# Patient Record
Sex: MALE | Race: WHITE | NOT HISPANIC OR LATINO | Employment: UNEMPLOYED | URBAN - METROPOLITAN AREA
[De-identification: names, ages, dates, MRNs, and addresses within clinical notes are randomized per-mention and may not be internally consistent; named-entity substitution may affect disease eponyms.]

---

## 2017-02-27 DIAGNOSIS — F41.1 GENERALIZED ANXIETY DISORDER: ICD-10-CM

## 2017-02-27 RX ORDER — ESCITALOPRAM OXALATE 10 MG/1
10 TABLET ORAL DAILY
Qty: 90 TABLET | Refills: 0 | Status: SHIPPED | OUTPATIENT
Start: 2017-02-27 | End: 2017-03-27

## 2017-03-08 ENCOUNTER — PREP FOR SURGERY (OUTPATIENT)
Dept: GASTROENTEROLOGY | Facility: CLINIC | Age: 57
End: 2017-03-08

## 2017-03-08 DIAGNOSIS — K63.5 COLON POLYPS: Primary | ICD-10-CM

## 2017-03-08 RX ORDER — SODIUM CHLORIDE 0.9 % (FLUSH) 0.9 %
1-10 SYRINGE (ML) INJECTION AS NEEDED
Status: CANCELLED | OUTPATIENT
Start: 2017-03-08

## 2017-03-11 DIAGNOSIS — F41.1 GENERALIZED ANXIETY DISORDER: ICD-10-CM

## 2017-03-13 RX ORDER — ESCITALOPRAM OXALATE 10 MG/1
TABLET ORAL
Qty: 90 TABLET | Refills: 1 | Status: SHIPPED | OUTPATIENT
Start: 2017-03-13 | End: 2017-10-04 | Stop reason: SDUPTHER

## 2017-03-27 ENCOUNTER — OFFICE VISIT (OUTPATIENT)
Dept: FAMILY MEDICINE CLINIC | Facility: CLINIC | Age: 57
End: 2017-03-27

## 2017-03-27 VITALS
OXYGEN SATURATION: 98 % | DIASTOLIC BLOOD PRESSURE: 88 MMHG | WEIGHT: 145.4 LBS | HEART RATE: 88 BPM | SYSTOLIC BLOOD PRESSURE: 130 MMHG | BODY MASS INDEX: 22.82 KG/M2 | HEIGHT: 67 IN

## 2017-03-27 DIAGNOSIS — R21 RASH: ICD-10-CM

## 2017-03-27 DIAGNOSIS — F41.1 GENERALIZED ANXIETY DISORDER: Primary | ICD-10-CM

## 2017-03-27 PROCEDURE — 99213 OFFICE O/P EST LOW 20 MIN: CPT | Performed by: INTERNAL MEDICINE

## 2017-03-27 NOTE — PROGRESS NOTES
CC: Anxiety follow-up. New lower leg rash.    He was started on Lexapro in 2015 for emotional lability due to stress at work. Still stressed by work-related issues, but handling it well with the Lexapro. He has not needed any Xanax since December. He tolerates it without side-effect. Overall, he is stable.    He has mild ED with difficulty obtaining and maintaining an erection.    Rash on the left lower leg, onset mid-March, no better with topical ABX ointment and an initial course of Keflex from the  [facility].  Now starting to heal, since stopping all ointments.  Initially, it was a flat purpuric eruption that coalesced.    Non smoker.      Current Outpatient Prescriptions:   •  escitalopram (LEXAPRO) 10 MG tablet, TAKE 1 TABLET BY MOUTH DAILY., Disp: 90 tablet, Rfl: 1  •  VERAMYST 27.5 MCG/SPRAY nasal spray, INSTILL 2 SPRAYS INTO EACH NOSTIL ONCE DAILY, Disp: , Rfl: 11,    ROS  No weight change. No other skin changes. Recent prostatitis.  No arthralgia/myalgia.    Exam  In NAD; in good spirits.  WD/WN.  No cervical or inguinal LAD.  Healing area of erythema with a central area of skin breakdown; no purulent discharge.  Minimal tenderness.       Keaton was seen today for anxiety and rash.    Diagnoses and all orders for this visit:    Generalized anxiety disorder    Rash    Continue the Lexapro unchanged.  Call if the Erectile Dysfunction becomes more problematic and we will try Cialis.  He may try decreasing the Lexapro later this year (over the summer) if he feels the dust has settled from the transition to a new .    The rash looks like a focal vasculitic-type eruption that is now healing.  The trigger is unknown, and there are no other sites like it on exam.  Continue to leave the ointments off it. Call if it starts to recur at that site or at any new sites.  We will try triamcinolone topical CREAM if it recurs, but we will avoid ointments since multiple topical ABX ointments seemed to  exacerbate it.

## 2017-05-25 DIAGNOSIS — F41.1 GENERALIZED ANXIETY DISORDER: ICD-10-CM

## 2017-05-25 RX ORDER — ESCITALOPRAM OXALATE 10 MG/1
TABLET ORAL
Qty: 90 TABLET | Refills: 1 | Status: SHIPPED | OUTPATIENT
Start: 2017-05-25 | End: 2017-10-04 | Stop reason: SDUPTHER

## 2017-06-30 ENCOUNTER — HOSPITAL ENCOUNTER (OUTPATIENT)
Facility: HOSPITAL | Age: 57
Setting detail: HOSPITAL OUTPATIENT SURGERY
Discharge: HOME | End: 2017-06-30
Attending: INTERNAL MEDICINE | Admitting: INTERNAL MEDICINE

## 2017-06-30 ENCOUNTER — ANESTHESIA (OUTPATIENT)
Dept: GASTROENTEROLOGY | Facility: HOSPITAL | Age: 57
End: 2017-06-30

## 2017-06-30 ENCOUNTER — ANESTHESIA EVENT (OUTPATIENT)
Dept: GASTROENTEROLOGY | Facility: HOSPITAL | Age: 57
End: 2017-06-30

## 2017-06-30 VITALS
HEIGHT: 67 IN | SYSTOLIC BLOOD PRESSURE: 126 MMHG | HEART RATE: 60 BPM | WEIGHT: 139.38 LBS | TEMPERATURE: 97.7 F | RESPIRATION RATE: 16 BRPM | BODY MASS INDEX: 21.88 KG/M2 | DIASTOLIC BLOOD PRESSURE: 94 MMHG | OXYGEN SATURATION: 99 %

## 2017-06-30 PROCEDURE — 45378 DIAGNOSTIC COLONOSCOPY: CPT | Performed by: INTERNAL MEDICINE

## 2017-06-30 PROCEDURE — 25010000002 PROPOFOL 10 MG/ML EMULSION: Performed by: ANESTHESIOLOGY

## 2017-06-30 RX ORDER — SODIUM CHLORIDE, SODIUM LACTATE, POTASSIUM CHLORIDE, CALCIUM CHLORIDE 600; 310; 30; 20 MG/100ML; MG/100ML; MG/100ML; MG/100ML
1000 INJECTION, SOLUTION INTRAVENOUS CONTINUOUS PRN
Status: DISCONTINUED | OUTPATIENT
Start: 2017-06-30 | End: 2017-06-30 | Stop reason: HOSPADM

## 2017-06-30 RX ORDER — LIDOCAINE HYDROCHLORIDE 20 MG/ML
INJECTION, SOLUTION INFILTRATION; PERINEURAL AS NEEDED
Status: DISCONTINUED | OUTPATIENT
Start: 2017-06-30 | End: 2017-06-30 | Stop reason: SURG

## 2017-06-30 RX ORDER — PROPOFOL 10 MG/ML
VIAL (ML) INTRAVENOUS AS NEEDED
Status: DISCONTINUED | OUTPATIENT
Start: 2017-06-30 | End: 2017-06-30 | Stop reason: SURG

## 2017-06-30 RX ORDER — PROPOFOL 10 MG/ML
VIAL (ML) INTRAVENOUS CONTINUOUS PRN
Status: DISCONTINUED | OUTPATIENT
Start: 2017-06-30 | End: 2017-06-30 | Stop reason: SURG

## 2017-06-30 RX ADMIN — PROPOFOL 300 MCG/KG/MIN: 10 INJECTION, EMULSION INTRAVENOUS at 11:23

## 2017-06-30 RX ADMIN — LIDOCAINE HYDROCHLORIDE 50 MG: 20 INJECTION, SOLUTION INFILTRATION; PERINEURAL at 11:21

## 2017-06-30 RX ADMIN — PROPOFOL 100 MG: 10 INJECTION, EMULSION INTRAVENOUS at 11:21

## 2017-06-30 RX ADMIN — SODIUM CHLORIDE, POTASSIUM CHLORIDE, SODIUM LACTATE AND CALCIUM CHLORIDE: 600; 310; 30; 20 INJECTION, SOLUTION INTRAVENOUS at 11:16

## 2017-06-30 RX ADMIN — SODIUM CHLORIDE, POTASSIUM CHLORIDE, SODIUM LACTATE AND CALCIUM CHLORIDE 1000 ML: 600; 310; 30; 20 INJECTION, SOLUTION INTRAVENOUS at 10:39

## 2017-06-30 NOTE — ANESTHESIA PREPROCEDURE EVALUATION
Anesthesia Evaluation     Patient summary reviewed and Nursing notes reviewed   NPO Solid Status: > 8 hours  NPO Liquid Status: > 2 hours     Airway   Mallampati: II  Dental      Pulmonary - negative pulmonary ROS and normal exam    breath sounds clear to auscultation  Cardiovascular - negative cardio ROS and normal exam        Neuro/Psych  (+) psychiatric history Anxiety,    GI/Hepatic/Renal/Endo - negative ROS     Musculoskeletal (-) negative ROS    Abdominal    Substance History - negative use     OB/GYN          Other - negative ROS                                       Anesthesia Plan    ASA 2     MAC   total IV anesthesia  intravenous induction   Anesthetic plan and risks discussed with patient.

## 2017-06-30 NOTE — ANESTHESIA POSTPROCEDURE EVALUATION
Patient: Keaton Holloway    Procedure Summary     Date Anesthesia Start Anesthesia Stop Room / Location    06/30/17 1118 1143  [facility]       Procedure Diagnosis Surgeon Provider    COLONOSCOPY into cecum and TI (N/A ) Colon polyps  (Colon polyps [K63.5]) MD Gopal Hernandez MD          Anesthesia Type: MAC  Last vitals  /94 (06/30/17 1200)    Temp      Pulse 60 (06/30/17 1200)   Resp 16 (06/30/17 1200)    SpO2 99 % (06/30/17 1200)      Post Anesthesia Care and Evaluation    Patient location during evaluation: PHASE II  Anesthetic complications: No anesthetic complications

## 2017-10-04 ENCOUNTER — OFFICE VISIT (OUTPATIENT)
Dept: FAMILY MEDICINE CLINIC | Facility: CLINIC | Age: 57
End: 2017-10-04

## 2017-10-04 VITALS
WEIGHT: 147 LBS | SYSTOLIC BLOOD PRESSURE: 130 MMHG | HEART RATE: 80 BPM | DIASTOLIC BLOOD PRESSURE: 86 MMHG | HEIGHT: 67 IN | BODY MASS INDEX: 23.07 KG/M2 | OXYGEN SATURATION: 98 %

## 2017-10-04 DIAGNOSIS — K40.20 BILATERAL INGUINAL HERNIA WITHOUT OBSTRUCTION OR GANGRENE, RECURRENCE NOT SPECIFIED: ICD-10-CM

## 2017-10-04 DIAGNOSIS — F41.1 GENERALIZED ANXIETY DISORDER: Primary | ICD-10-CM

## 2017-10-04 PROCEDURE — 99213 OFFICE O/P EST LOW 20 MIN: CPT | Performed by: INTERNAL MEDICINE

## 2017-10-04 RX ORDER — ESCITALOPRAM OXALATE 10 MG/1
10 TABLET ORAL DAILY
Qty: 90 TABLET | Refills: 1 | Status: SHIPPED | OUTPATIENT
Start: 2017-10-04 | End: 2018-04-04 | Stop reason: SDUPTHER

## 2017-10-04 NOTE — PROGRESS NOTES
"Subjective   Keaton Holloway is a 57 y.o. male who presents today for:    Anxiety and Hiatal Hernia (R side- check)    History of Present Illness   He was started on Lexapro in 2015 for emotional lability due to stress at work. Still stressed by work-related issues, but handling it well with the Lexapro. He has not needed any Xanax since December. He tolerates it without side-effect. Overall, he is stable.      He has only needed one Xanax in the memorable past (before a board meeting).     He has mild ED with difficulty obtaining and maintaining an erection.        Mr. Holloway  reports that he has never smoked. He has never used smokeless tobacco. He reports that he drinks about 1.2 oz of alcohol per week  He reports that he does not use illicit drugs.     No Known Allergies    Current Outpatient Prescriptions:   •  escitalopram (LEXAPRO) 10 MG tablet, TAKE 1 TABLET BY MOUTH DAILY., Disp: 90 tablet, Rfl: 1  •  VERAMYST 27.5 MCG/SPRAY nasal spray, INSTILL 2 SPRAYS INTO EACH NOSTIL ONCE DAILY, Disp: , Rfl: 11      Review of Systems   Constitutional: Negative for unexpected weight change.   Genitourinary:        No ED s/e's.   Psychiatric/Behavioral: Positive for dysphoric mood. Negative for sleep disturbance. The patient is nervous/anxious.          Objective   Vitals:    10/04/17 1057   BP: 130/86   BP Location: Left arm   Patient Position: Sitting   Cuff Size: Adult   Pulse: 80   SpO2: 98%   Weight: 147 lb (66.7 kg)   Height: 67\" (170.2 cm)     Physical Exam   Constitutional: He is oriented to person, place, and time. He appears well-developed and well-nourished.   Abdominal: Soft. Bowel sounds are normal. He exhibits no abdominal bruit. There is no hepatomegaly. There is no tenderness. No hernia.   Neurological: He is alert and oriented to person, place, and time.   Psychiatric: He has a normal mood and affect. His speech is normal and behavior is normal. Judgment and thought content normal.   Upbeat today.   No " skin lesion on the left temple.  Reducible bilateral inguinal hernias; nontender.      Assessment/Plan   Keaton was seen today for anxiety and hiatal hernia.    Diagnoses and all orders for this visit:    Generalized anxiety disorder    Bilateral inguinal hernia without obstruction or gangrene, recurrence not specified    Doing well on the Lexapro at the current dose.  He may continue the prn Xanax.      Call if the hernia becomes a recurrent/persistent problem.

## 2017-11-16 DIAGNOSIS — F41.1 GENERALIZED ANXIETY DISORDER: ICD-10-CM

## 2017-11-16 RX ORDER — ESCITALOPRAM OXALATE 10 MG/1
TABLET ORAL
Qty: 90 TABLET | Refills: 1 | Status: SHIPPED | OUTPATIENT
Start: 2017-11-16 | End: 2018-04-04 | Stop reason: SDUPTHER

## 2018-04-04 ENCOUNTER — OFFICE VISIT (OUTPATIENT)
Dept: FAMILY MEDICINE CLINIC | Facility: CLINIC | Age: 58
End: 2018-04-04

## 2018-04-04 VITALS
SYSTOLIC BLOOD PRESSURE: 125 MMHG | DIASTOLIC BLOOD PRESSURE: 86 MMHG | HEIGHT: 67 IN | HEART RATE: 65 BPM | OXYGEN SATURATION: 99 % | BODY MASS INDEX: 23.62 KG/M2 | WEIGHT: 150.5 LBS

## 2018-04-04 DIAGNOSIS — K40.20 BILATERAL INGUINAL HERNIA WITHOUT OBSTRUCTION OR GANGRENE, RECURRENCE NOT SPECIFIED: ICD-10-CM

## 2018-04-04 DIAGNOSIS — F41.1 GENERALIZED ANXIETY DISORDER: Primary | ICD-10-CM

## 2018-04-04 PROCEDURE — 99213 OFFICE O/P EST LOW 20 MIN: CPT | Performed by: INTERNAL MEDICINE

## 2018-04-04 RX ORDER — ESCITALOPRAM OXALATE 10 MG/1
10 TABLET ORAL DAILY
Qty: 90 TABLET | Refills: 1 | Status: SHIPPED | OUTPATIENT
Start: 2018-04-04 | End: 2018-12-14 | Stop reason: SDUPTHER

## 2018-04-04 NOTE — PROGRESS NOTES
"Subjective   Keaton Holloway is a 58 y.o. male who presents today for:    Anxiety (6 mo f/u ) and Hiatal Hernia    History of Present Illness     He was started on Lexapro in 2015 for emotional lability due to stress at work. Still stressed by work-related issues, but handling it well with the Lexapro. He has not needed any Xanax since December. He tolerates it without side-effect. Overall, he is stable.       He has only needed one Xanax in the memorable past (before a board meeting in Fall, 2017).     He has mild ED with difficulty obtaining and maintaining an erection.    He has bilateral inguinal hernias that have not bothered him.      Mr. Holloway  reports that he has never smoked. He has never used smokeless tobacco. He reports that he drinks about 1.2 oz of alcohol per week . He reports that he does not use drugs.     No Known Allergies    Current Outpatient Prescriptions:   •  escitalopram (LEXAPRO) 10 MG tablet, Take 1 tablet by mouth Daily., Disp: 90 tablet, Rfl: 1  •  VERAMYST 27.5 MCG/SPRAY nasal spray, INSTILL 2 SPRAYS INTO EACH NOSTIL ONCE DAILY, Disp: , Rfl: 11      Review of Systems   Constitutional: Negative for unexpected weight change.   Eyes: Positive for visual disturbance.   Respiratory: Negative for chest tightness.    Gastrointestinal: Negative for abdominal pain, constipation and diarrhea.   Genitourinary: Negative.  Negative for scrotal swelling and testicular pain.   Neurological: Negative for dizziness and weakness.   Psychiatric/Behavioral: Negative for dysphoric mood and sleep disturbance. The patient is nervous/anxious.    Fleeting vision changes (spots in his vision) on one occasion.      Objective   Vitals:    04/04/18 1003   BP: 125/86   BP Location: Left arm   Patient Position: Sitting   Cuff Size: Adult   Pulse: 65   SpO2: 99%   Weight: 68.3 kg (150 lb 8 oz)   Height: 170.2 cm (67\")     Physical Exam    Well-developed, well-nourished, in no acute distress.  Mood is upbeat and " affect is appropriate.  Insight and judgment appear to be fully intact.  Speech is normal in pace, content, and articulation.  Regular rate and rhythm. No murmur.  No abdominal tenderness. No tenderness over the ankle hernias.  No lower extremity edema.        Keaton was seen today for anxiety and inguinal hernia.    Diagnoses and all orders for this visit:    Generalized anxiety disorder    Bilateral inguinal hernia without obstruction or gangrene, recurrence not specified    He is doing well on the current dose of Lexapro. No change in medications at this time.    He'll call us if his inguinal hernias become symptomatic. Otherwise, no intervention was recommended at this time.

## 2018-12-14 ENCOUNTER — OFFICE VISIT (OUTPATIENT)
Dept: FAMILY MEDICINE CLINIC | Facility: CLINIC | Age: 58
End: 2018-12-14

## 2018-12-14 VITALS
HEART RATE: 69 BPM | OXYGEN SATURATION: 99 % | WEIGHT: 144.1 LBS | DIASTOLIC BLOOD PRESSURE: 90 MMHG | BODY MASS INDEX: 22.62 KG/M2 | HEIGHT: 67 IN | SYSTOLIC BLOOD PRESSURE: 118 MMHG

## 2018-12-14 DIAGNOSIS — F41.1 GENERALIZED ANXIETY DISORDER: Primary | ICD-10-CM

## 2018-12-14 PROCEDURE — 99213 OFFICE O/P EST LOW 20 MIN: CPT | Performed by: INTERNAL MEDICINE

## 2018-12-14 RX ORDER — ESCITALOPRAM OXALATE 10 MG/1
10 TABLET ORAL DAILY
Qty: 90 TABLET | Refills: 3 | Status: SHIPPED | OUTPATIENT
Start: 2018-12-14 | End: 2019-12-20 | Stop reason: SDUPTHER

## 2018-12-14 RX ORDER — ALPRAZOLAM 0.5 MG/1
0.5 TABLET ORAL DAILY PRN
Qty: 24 TABLET | Refills: 0 | Status: SHIPPED | OUTPATIENT
Start: 2018-12-14 | End: 2019-08-08

## 2018-12-14 NOTE — PROGRESS NOTES
"Subjective   Keaton Holloway is a 58 y.o. male who presents today for:    Anxiety    History of Present Illness     We started Lexapro in 2015 in response to his complaints of emotional lability due to stress at work.  He feels like he handles the work-related stress much better now that he is on the Lexapro, but he still struggles with the frustrations of his job.  He also took an alprazolam at the outset as a bridge to the Lexapro, and he still uses it (sparingly/rarely).  He has needed then again recently for poor sleep and the most stressful of situations..  He denies any side effects from the Lexapro; he denies excess sedation with the alprazolam.    He uses golf to decompress (during the fair-weather months) and works around his home otherwise.    Mr. Holloway  reports that  has never smoked. he has never used smokeless tobacco. He reports that he drinks about 1.2 oz of alcohol per week. He reports that he does not use drugs.     No Known Allergies    Current Outpatient Medications:   •  escitalopram (LEXAPRO) 10 MG tablet, Take 1 tablet by mouth Daily., Disp: 90 tablet, Rfl: 1  •  VERAMYST 27.5 MCG/SPRAY nasal spray, INSTILL 2 SPRAYS INTO EACH NOSTIL ONCE DAILY, Disp: , Rfl: 11      Review of Systems   Constitutional: Negative for unexpected weight change.   Cardiovascular: Negative for palpitations.   Psychiatric/Behavioral: Negative for dysphoric mood and sleep disturbance. The patient is nervous/anxious.          Objective   Vitals:    12/14/18 1119   Height: 170.2 cm (67\")     Physical Exam    Well-developed, well-nourished, but thin male in no acute distress.  Mood is upbeat and affect is appropriate.  Insight and judgment are intact.  He is alert and oriented ×4 inches all questions appropriately.          Keaton was seen today for anxiety.    Diagnoses and all orders for this visit:    Generalized anxiety disorder    Continue Lexapro unchanged, taking the alprazolam when needed.  Prescription for #20/0 " of the alprazolam 0.5 mg tablets was given to the patient today.  As expected, he has not needed it with any regularity; a prescription from 2015 has lasted him until now, and he still has a few pills left.  ESTHER report was obtained and reviewed during the course of today's office visit.  He continues to derive good benefit from both medications.  I suspect he will need the Lexapro for the foreseeable future, although we will always keep our eye open to an opportunity to decrease or eliminate it if life stressors are more manageable.

## 2019-01-31 DIAGNOSIS — Z00.00 ROUTINE GENERAL MEDICAL EXAMINATION AT A HEALTH CARE FACILITY: Primary | ICD-10-CM

## 2019-01-31 DIAGNOSIS — Z12.5 SCREENING PSA (PROSTATE SPECIFIC ANTIGEN): ICD-10-CM

## 2019-02-03 ENCOUNTER — RESULTS ENCOUNTER (OUTPATIENT)
Dept: FAMILY MEDICINE CLINIC | Facility: CLINIC | Age: 59
End: 2019-02-03

## 2019-02-03 DIAGNOSIS — Z12.5 SCREENING PSA (PROSTATE SPECIFIC ANTIGEN): ICD-10-CM

## 2019-02-03 DIAGNOSIS — Z00.00 ROUTINE GENERAL MEDICAL EXAMINATION AT A HEALTH CARE FACILITY: ICD-10-CM

## 2019-08-08 ENCOUNTER — OFFICE VISIT (OUTPATIENT)
Dept: INTERNAL MEDICINE | Facility: CLINIC | Age: 59
End: 2019-08-08

## 2019-08-08 VITALS
HEART RATE: 94 BPM | WEIGHT: 152 LBS | OXYGEN SATURATION: 98 % | SYSTOLIC BLOOD PRESSURE: 130 MMHG | DIASTOLIC BLOOD PRESSURE: 90 MMHG | TEMPERATURE: 99 F | HEIGHT: 67 IN | BODY MASS INDEX: 23.86 KG/M2

## 2019-08-08 DIAGNOSIS — Z87.438 HX OF ACUTE PROSTATITIS: ICD-10-CM

## 2019-08-08 DIAGNOSIS — B07.9 VIRAL WART ON FINGER: ICD-10-CM

## 2019-08-08 DIAGNOSIS — Z00.00 HEALTHCARE MAINTENANCE: Primary | ICD-10-CM

## 2019-08-08 DIAGNOSIS — R03.0 BLOOD PRESSURE ELEVATED WITHOUT HISTORY OF HTN: ICD-10-CM

## 2019-08-08 DIAGNOSIS — J30.89 ENVIRONMENTAL AND SEASONAL ALLERGIES: ICD-10-CM

## 2019-08-08 DIAGNOSIS — F41.1 GENERALIZED ANXIETY DISORDER: ICD-10-CM

## 2019-08-08 PROBLEM — Z86.0100 HISTORY OF COLON POLYPS: Status: ACTIVE | Noted: 2019-08-08

## 2019-08-08 PROBLEM — K57.30 DIVERTICULOSIS OF LARGE INTESTINE WITHOUT HEMORRHAGE: Status: ACTIVE | Noted: 2019-08-08

## 2019-08-08 PROBLEM — Z91.09 ENVIRONMENTAL ALLERGIES: Status: ACTIVE | Noted: 2019-08-08

## 2019-08-08 PROBLEM — Z86.010 HISTORY OF COLON POLYPS: Status: ACTIVE | Noted: 2019-08-08

## 2019-08-08 PROCEDURE — 93000 ELECTROCARDIOGRAM COMPLETE: CPT | Performed by: INTERNAL MEDICINE

## 2019-08-08 PROCEDURE — 90471 IMMUNIZATION ADMIN: CPT | Performed by: INTERNAL MEDICINE

## 2019-08-08 PROCEDURE — 99396 PREV VISIT EST AGE 40-64: CPT | Performed by: INTERNAL MEDICINE

## 2019-08-08 PROCEDURE — 90472 IMMUNIZATION ADMIN EACH ADD: CPT | Performed by: INTERNAL MEDICINE

## 2019-08-08 PROCEDURE — 90715 TDAP VACCINE 7 YRS/> IM: CPT | Performed by: INTERNAL MEDICINE

## 2019-08-08 PROCEDURE — 17110 DESTRUCTION B9 LES UP TO 14: CPT | Performed by: INTERNAL MEDICINE

## 2019-08-08 PROCEDURE — 90632 HEPA VACCINE ADULT IM: CPT | Performed by: INTERNAL MEDICINE

## 2019-08-08 RX ORDER — ALPRAZOLAM 0.5 MG/1
0.5 TABLET ORAL 2 TIMES DAILY PRN
COMMUNITY
End: 2020-12-02

## 2019-08-08 RX ORDER — CETIRIZINE HYDROCHLORIDE 10 MG/1
10 TABLET ORAL DAILY PRN
COMMUNITY
End: 2022-10-31

## 2019-08-08 RX ORDER — BILBERRY FRUIT 1000 MG
CAPSULE ORAL
COMMUNITY

## 2019-08-08 NOTE — PROGRESS NOTES
"Annual Exam (new pt cpe )      HPI  Keaton Holloway is a 59 y.o. male RTC in yearly CPE, review of medical issues: Generally has been pretty healthy.  1. Prostatitis - infection x 2 since 2013. Was placed on Abx from urology, Dr. Mancilla, and was on meds for nearly 3 months initially.   2. CHRIS - had some real job stress a few years ago when work load increased. Saw PCP at that time and took Xanax for one week and got \"my sleep pattern back\".  Has been on Lexapro daily since.  Still has lots of job stress and so decided to stay on Lexapro.  Is only on rare Xanax at this time, uses maybe 2 pills in last 6 months. Feels like mood is overall controlled. No panic attacks, never had one.   3. Environmental Allergies - has been on shots since 1982.  Sees Dr. Martinez in allergy.  Is on 1 shot q 2 weeks. On Veramyst in past, now Sensimyst OTC now. Zyrtec use when going to cut grass.  Issues are mostly Spring and Fall.  Does have itchiness to raw apples and raw celery.    4. Hx of elevated blood pressure without dx of HTN - occurred on stressful day when at allergy shot clinic.  Started tracking at home and getting 110-141/ .  Notes that pressure has been higher than usual but better than it was at allergy office.  In past has had \"lower blood pressure\". Is resting prior to checking pressure, usually at least 5 minutes. Will check at night often.   5. BCC - dx'd by derm, removed OPT ~2016. Sees Dr. Nieves annually. No lesions of concern right now.     \"I have a wart\". On L ring finger.  Present for months.  Picks and will come back.  Bleeds sometimes when picks it.  Has had warts in past.  Wants to have frozen off if can. Distant history of plantar warts.       Review of Systems   Constitution: Positive for weight gain (recently). Negative for chills, fever and malaise/fatigue.   HENT: Negative for congestion, hearing loss, odynophagia and sore throat.    Eyes: Negative for discharge, double vision, pain and redness.        " Last eye exam ~6/2019; wears glasses     Cardiovascular: Negative for chest pain, dyspnea on exertion, irregular heartbeat, leg swelling, near-syncope, palpitations and syncope.   Respiratory: Negative for cough and shortness of breath.    Endocrine: Negative for polydipsia, polyphagia and polyuria.   Hematologic/Lymphatic: Negative for bleeding problem. Does not bruise/bleed easily.   Skin: Positive for suspicious lesions (on L shoulder). Negative for rash and skin cancer (hx of only).   Musculoskeletal: Positive for joint pain (R 3rd finger,jammed finger, getting better). Negative for arthritis, joint swelling, muscle cramps, muscle weakness and myalgias.   Gastrointestinal: Negative for constipation, diarrhea, dysphagia, heartburn, nausea and vomiting.   Genitourinary: Negative for dysuria, frequency, hematuria, hesitancy and nocturia.   Neurological: Negative for dizziness, headaches and light-headedness.   Psychiatric/Behavioral: Negative for depression. The patient does not have insomnia and is not nervous/anxious (controlled).    Allergic/Immunologic: Negative for environmental allergies (controlled) and persistent infections.       Problem List:    Patient Active Problem List   Diagnosis   • Generalized anxiety disorder   • Environmental and seasonal allergies   • Hx of acute prostatitis   • Blood pressure elevated without history of HTN   • Diverticulosis of large intestine without hemorrhage   • History of colon polyps   • Diverticulosis of large intestine without hemorrhage       Medical History:    Past Medical History:   Diagnosis Date   • Diverticulosis of large intestine without hemorrhage 8/8/2019    Noted on C-scope 6/2017   • Environmental allergies 8/8/2019    On immunotherapy since 1982. Peanut, celery, and apple in raw form cause itchiness in throat; no hx of anaphylaxis.    • Generalized anxiety disorder 2/23/2016   • Plantar warts     in youth   • Prostatitis, acute     2013 and 2014   •  "Shingles     in college        Social History:    Social History     Socioeconomic History   • Marital status:      Spouse name: Not on file   • Number of children: Not on file   • Years of education: Not on file   • Highest education level: Not on file   Occupational History   • Occupation:      Employer: [employer]   Tobacco Use   • Smoking status: Never Smoker   • Smokeless tobacco: Never Used   Substance and Sexual Activity   • Alcohol use: Yes     Alcohol/week: 1.2 oz     Types: 2 Standard drinks or equivalent per week     Comment: 1-2 drinks/ week   • Drug use: No   • Sexual activity: Yes     Partners: Female     Comment: wife only; no hx of STD's   Lifestyle   • Physical activity:     Days per week: 1 day     Minutes per session: 60 min   • Stress: Not on file   Social History Narrative    Diet - overall healthy, \"reasonably\"; eats fruits and vegetables    Exercise - variable with golf and weights    Caffeine - 1 -2 cup coffee in AM; soda at lunch       Family History:   Family History   Problem Relation Age of Onset   • Heart attack Mother    • Osteoarthritis Mother    • Osteoarthritis Father    • Hypertension Father    • Hypertension Sister    • Lung cancer Paternal Grandmother    • Cerebral aneurysm Paternal Grandfather         hemorrhaged       Surgical History:   Past Surgical History:   Procedure Laterality Date   • COLONOSCOPY  11/30/2012    Within normal limits. 3mm hyperplastic polyp; Dr. Miller. TV adenoma in 03/2010   • COLONOSCOPY N/A 6/30/2017    Procedure: COLONOSCOPY into cecum and TI;  Surgeon: Van Miller MD;  Location: [facility];  Service:    • ENDOSCOPY  03/2010    Mild Gastritis; Dr. Miller   • MOLE REMOVAL      benign         Current Outpatient Medications:   •  ALPRAZolam (XANAX) 0.5 MG tablet, Take 0.5 mg by mouth 2 (Two) Times a Day As Needed for Anxiety (take 1/2 to 1 tablet by mouth daily as needed for insomnia or anxiety)., Disp: , Rfl: "   •  cetirizine (zyrTEC) 10 MG tablet, Take 10 mg by mouth Daily As Needed., Disp: , Rfl:   •  escitalopram (LEXAPRO) 10 MG tablet, Take 1 tablet by mouth Daily., Disp: 90 tablet, Rfl: 3  •  fluticasone (VERAMYST) 27.5 MCG/SPRAY nasal spray, 2 sprays into the nostril(s) as directed by provider Daily., Disp: , Rfl:   •  Saw Palmetto 1000 MG capsule, Take  by mouth., Disp: , Rfl:     Vitals:    08/08/19 1507   BP: 130/90   Pulse: 94   Temp: 99 °F (37.2 °C)   SpO2: 98%       Physical Exam   Constitutional: He is oriented to person, place, and time. He appears well-developed and well-nourished. He is cooperative. No distress.   HENT:   Head: Normocephalic and atraumatic.   Right Ear: Hearing, tympanic membrane, external ear and ear canal normal.   Left Ear: Hearing, tympanic membrane, external ear and ear canal normal.   Nose: Nose normal.   Mouth/Throat: Uvula is midline, oropharynx is clear and moist and mucous membranes are normal. No oropharyngeal exudate.   L TM partially obscured by cerumen     Eyes: Conjunctivae, EOM and lids are normal. Pupils are equal, round, and reactive to light. Right eye exhibits no discharge. Left eye exhibits no discharge. No scleral icterus.   Neck: Normal range of motion and full passive range of motion without pain. Neck supple. Carotid bruit is not present. No thyroid mass and no thyromegaly present.   Cardiovascular: Normal rate, regular rhythm, S1 normal, S2 normal and normal heart sounds. Exam reveals no gallop and no friction rub.   No murmur heard.  Pulses:       Radial pulses are 2+ on the right side, and 2+ on the left side.        Dorsalis pedis pulses are 2+ on the right side, and 2+ on the left side.        Posterior tibial pulses are 2+ on the right side, and 2+ on the left side.   Pulmonary/Chest: Effort normal and breath sounds normal. No respiratory distress. He has no wheezes. He has no rhonchi. He has no rales.   Abdominal: Soft. Bowel sounds are normal. He exhibits  no distension and no mass. There is no hepatosplenomegaly. There is no tenderness. There is no rebound and no guarding.   Genitourinary: Rectum normal and prostate normal. Prostate is not enlarged and not tender.   Musculoskeletal: Normal range of motion. He exhibits no edema.     Vascular Status -  His right foot exhibits normal foot vasculature  and no edema. His left foot exhibits normal foot vasculature  and no edema.  Skin Integrity  -  His right foot skin is intact.His left foot skin is intact..  Lymphadenopathy:     He has no cervical adenopathy.     He has no axillary adenopathy.        Right: No inguinal adenopathy present.        Left: No inguinal adenopathy present.   Neurological: He is alert and oriented to person, place, and time. He has normal strength and normal reflexes. He displays no tremor. No cranial nerve deficit or sensory deficit. He exhibits normal muscle tone. Gait normal.   Skin: Skin is warm, dry and intact. Lesion (verrucous lesion ~3mm on lateral border of L ring finger. ) noted. No rash noted.   Psychiatric: He has a normal mood and affect. His speech is normal and behavior is normal. Cognition and memory are normal.   Vitals reviewed.        ECG 12 Lead  Date/Time: 8/8/2019 4:12 PM  Performed by: Tom Garza MD  Authorized by: Tom Garza MD   Comparison: not compared with previous ECG   Previous ECG: no previous ECG available  Rhythm: sinus rhythm  Rate: normal  BPM: 92  ST Segments: ST segments normal  T Waves: T waves normal  QRS axis: normal    Clinical impression: normal ECG  Comments: QTc - 425  Indication - new pt CPE, baseline        Cryotherapy, Skin Lesion  Date/Time: 8/8/2019 4:37 PM  Performed by: Tom Garza MD  Authorized by: Tom Garza MD   Consent: Verbal consent obtained.  Risks and benefits: risks, benefits and alternatives were discussed  Consent given by: patient  Patient understanding: patient states understanding of the procedure being  performed  Local anesthesia used: no    Anesthesia:  Local anesthesia used: no  Patient tolerance: Patient tolerated the procedure well with no immediate complications  Comments: 15 seconds of cryotherapy x 3 on L ring finger at verrucous lesion, tolerated well.           Assessment/ Plan  Diagnoses and all orders for this visit:    Healthcare maintenance  -     Hepatitis A Vaccine Adult IM  -     Tdap Vaccine Greater Than or Equal To 6yo IM  -     ECG 12 Lead  -     CBC & Differential  -     Comprehensive Metabolic Panel  -     Hepatitis C Antibody  -     Lipid Panel With LDL / HDL Ratio  -     TSH  -     UA / M With / Rflx Culture([facility] ONLY) - Urine, Clean Catch  -     Vitamin D 25 Hydroxy  -     PSA Screen    Environmental and seasonal allergies  -     CBC & Differential  -     Comprehensive Metabolic Panel    Generalized anxiety disorder  -     CBC & Differential  -     Comprehensive Metabolic Panel  -     TSH    Hx of acute prostatitis  -     UA / M With / Rflx Culture([facility] ONLY) - Urine, Clean Catch    Blood pressure elevated without history of HTN  -     ECG 12 Lead  -     Comprehensive Metabolic Panel    Viral wart on finger  -     Cryotherapy, Skin Lesion    Other orders  -     ALPRAZolam (XANAX) 0.5 MG tablet; Take 0.5 mg by mouth 2 (Two) Times a Day As Needed for Anxiety (take 1/2 to 1 tablet by mouth daily as needed for insomnia or anxiety).  -     Saw Palmetto 1000 MG capsule; Take  by mouth.  -     fluticasone (VERAMYST) 27.5 MCG/SPRAY nasal spray; 2 sprays into the nostril(s) as directed by provider Daily.  -     cetirizine (zyrTEC) 10 MG tablet; Take 10 mg by mouth Daily As Needed.        Return in about 1 week (around 8/15/2019).      Discussion:  Keaton Holloway is a 59 y.o. male RTC in yearly CPE, review of medical issues:   1. Hx of Prostatitis infection x 2 since 2013 - resolved at this time. No longer seeing Dr. Mancilla in urology.   2. CHRIS vs. Adjustment D/O with anxiety - started about 3  years ago with job stress. Well managed on Lexapro daily.  Xanax use is rare at this time. C/W same for now. Add exercise.   3. Environmental Allergies - on immunotherapy for years, intolerant off.  On Sensimist OTC daily with PRN cetirizine.   4. Hx of elevated blood pressure without dx of HTN - numbers borderline on home log and in office today, no prior HTN dx.  Will have pt bring home cuff to office next week and recheck pressure. Will need meds if not at < 130/80 goal.  Add exercise.   5. Hx of BCC - dx'd by derm, removed OPT ~2016. F/U Dr. Nieves annually.   6. Verrucous/ wart lesion - on L ring finger, acute issue, cryotherapy applied today. Tolerated well.   7. HM - check labs; Flu - UTD; Tdap/ Hep A #1 - today; Shingrix at pharmacy; C-scope 6/2017 (-) --> 5 years (hx of polyps); [facility], check PSA;  Check Hep C Ab screen; add more exercise.     RTC 1 week F labs and B/P check.   Hep A #2 in 6 months.

## 2019-08-19 ENCOUNTER — OFFICE VISIT (OUTPATIENT)
Dept: INTERNAL MEDICINE | Facility: CLINIC | Age: 59
End: 2019-08-19

## 2019-08-19 VITALS
OXYGEN SATURATION: 97 % | HEART RATE: 76 BPM | HEIGHT: 67 IN | SYSTOLIC BLOOD PRESSURE: 128 MMHG | DIASTOLIC BLOOD PRESSURE: 90 MMHG | BODY MASS INDEX: 23.7 KG/M2 | WEIGHT: 151 LBS | TEMPERATURE: 98.6 F

## 2019-08-19 DIAGNOSIS — B07.9 VIRAL WART ON FINGER: ICD-10-CM

## 2019-08-19 DIAGNOSIS — I10 ESSENTIAL HYPERTENSION: Primary | ICD-10-CM

## 2019-08-19 DIAGNOSIS — Z00.00 HEALTHCARE MAINTENANCE: ICD-10-CM

## 2019-08-19 PROBLEM — R03.0 BLOOD PRESSURE ELEVATED WITHOUT HISTORY OF HTN: Status: RESOLVED | Noted: 2019-08-08 | Resolved: 2019-08-19

## 2019-08-19 PROBLEM — K57.30 DIVERTICULOSIS OF LARGE INTESTINE WITHOUT HEMORRHAGE: Status: RESOLVED | Noted: 2019-08-08 | Resolved: 2019-08-19

## 2019-08-19 PROCEDURE — 99214 OFFICE O/P EST MOD 30 MIN: CPT | Performed by: INTERNAL MEDICINE

## 2019-08-19 RX ORDER — LOSARTAN POTASSIUM 25 MG/1
25 TABLET ORAL DAILY
Qty: 30 TABLET | Refills: 3 | Status: SHIPPED | OUTPATIENT
Start: 2019-08-19 | End: 2019-11-13 | Stop reason: SDUPTHER

## 2019-08-19 NOTE — PROGRESS NOTES
"Follow-up (from new pt cpe/ his b/p cuff- 136/102)      HPI  Keaton Holloway is a 59 y.o. male RTC in short interval f/u:  1. Elevated blood pressure without dx of HTN - noted last visit. WIfe concerned and with him today. Pt has never taken meds. Has home check on wrist cuff that correlated well today and notes bottom number just not getting much under 90.  Top number is OK.  Pt is still under stress with work.  Asx'ic from high pressure issues.   2. Verrucous/ wart lesion - on L ring finger,s/p cryotherapy last visit.  Feels like \"we got it\". Has no scab left over lesion at this time. Will see derm upcoming regardless for appt.     Review of Systems   Constitution: Negative for chills and fever.   Cardiovascular: Negative for chest pain, dyspnea on exertion, irregular heartbeat and palpitations.   Respiratory: Negative for shortness of breath.    Neurological: Negative for dizziness and light-headedness.       The following portions of the patient's history were reviewed and updated as appropriate: allergies, current medications, past medical history, past social history and problem list.      Current Outpatient Medications:   •  ALPRAZolam (XANAX) 0.5 MG tablet, Take 0.5 mg by mouth 2 (Two) Times a Day As Needed for Anxiety (take 1/2 to 1 tablet by mouth daily as needed for insomnia or anxiety)., Disp: , Rfl:   •  cetirizine (zyrTEC) 10 MG tablet, Take 10 mg by mouth Daily As Needed., Disp: , Rfl:   •  escitalopram (LEXAPRO) 10 MG tablet, Take 1 tablet by mouth Daily., Disp: 90 tablet, Rfl: 3  •  fluticasone (VERAMYST) 27.5 MCG/SPRAY nasal spray, 2 sprays into the nostril(s) as directed by provider Daily., Disp: , Rfl:   •  Saw Palmetto 1000 MG capsule, Take  by mouth., Disp: , Rfl:   •  losartan (COZAAR) 25 MG tablet, Take 1 tablet by mouth Daily., Disp: 30 tablet, Rfl: 3    Vitals:    08/19/19 0943   BP: 128/90   Pulse: 76   Temp: 98.6 °F (37 °C)   SpO2: 97%   Weight: 68.5 kg (151 lb)   Height: 170.2 cm (67\") "     Recheck: 120/ 92    Physical Exam   Constitutional: He is oriented to person, place, and time. He appears well-developed and well-nourished. No distress.   HENT:   Head: Normocephalic and atraumatic.   Eyes: Pupils are equal, round, and reactive to light.   Neck: Normal range of motion. Neck supple. Carotid bruit is not present.   Cardiovascular: Normal rate, regular rhythm and normal heart sounds.   Pulses:       Carotid pulses are 2+ on the right side, and 2+ on the left side.       Radial pulses are 2+ on the right side, and 2+ on the left side.   Pulmonary/Chest: Effort normal. No respiratory distress.   Neurological: He is alert and oriented to person, place, and time. No cranial nerve deficit. Gait normal.   Skin: No rash noted.        Psychiatric: He has a normal mood and affect. His behavior is normal.   Vitals reviewed.      Assessment/ Plan  Diagnoses and all orders for this visit:    Essential hypertension    Healthcare maintenance    Viral wart on finger    Other orders  -     losartan (COZAAR) 25 MG tablet; Take 1 tablet by mouth Daily.        Return in about 4 weeks (around 9/16/2019) for Recheck.      Discussion:  Keaton Holloway is a 59 y.o. Male RTC in short interval f/u after recent new pt CPE:  1. Hx of elevated blood pressure without dx of HTN --> new dx essential hypertension - new dx today. Arm cuff at home not correlating but wrist cuff is, with noted diastolic pressure consistently around 90. Will start ARB today, but will consider change to diuretic if does not control diastolic pressure well enough. Trend pressure and BMP in 4 weeks.   2. Verrucous/ wart lesion - on L ring finger, cryotherapy applied last week. Healing well, scab off. Monitor for resolution; retreat if needed.   3. HM - check F labs today;  Hep A #2 due ~2/2020; Shingrix at pharmacy; Check Hep C Ab screen today; add more exercise    RTC 4 weeks

## 2019-08-20 LAB
25(OH)D3+25(OH)D2 SERPL-MCNC: 42.6 NG/ML (ref 30–100)
ALBUMIN SERPL-MCNC: 4.8 G/DL (ref 3.5–5.2)
ALBUMIN/GLOB SERPL: 2.4 G/DL
ALP SERPL-CCNC: 48 U/L (ref 39–117)
ALT SERPL-CCNC: 29 U/L (ref 1–41)
APPEARANCE UR: CLEAR
AST SERPL-CCNC: 26 U/L (ref 1–40)
BACTERIA #/AREA URNS HPF: NORMAL /HPF
BASOPHILS # BLD AUTO: 0.02 10*3/MM3 (ref 0–0.2)
BASOPHILS NFR BLD AUTO: 0.5 % (ref 0–1.5)
BILIRUB SERPL-MCNC: 0.4 MG/DL (ref 0.2–1.2)
BILIRUB UR QL STRIP: NEGATIVE
BUN SERPL-MCNC: 14 MG/DL (ref 8–23)
BUN/CREAT SERPL: 15.1 (ref 7–25)
CALCIUM SERPL-MCNC: 9.3 MG/DL (ref 8.6–10.5)
CHLORIDE SERPL-SCNC: 106 MMOL/L (ref 98–107)
CHOLEST SERPL-MCNC: 177 MG/DL (ref 0–200)
CO2 SERPL-SCNC: 27.4 MMOL/L (ref 22–29)
COLOR UR: YELLOW
CREAT SERPL-MCNC: 0.93 MG/DL (ref 0.76–1.27)
EOSINOPHIL # BLD AUTO: 0.07 10*3/MM3 (ref 0–0.4)
EOSINOPHIL NFR BLD AUTO: 1.7 % (ref 0.3–6.2)
EPI CELLS #/AREA URNS HPF: NORMAL /HPF
ERYTHROCYTE [DISTWIDTH] IN BLOOD BY AUTOMATED COUNT: 13.1 % (ref 12.3–15.4)
GLOBULIN SER CALC-MCNC: 2 GM/DL
GLUCOSE SERPL-MCNC: 100 MG/DL (ref 65–99)
GLUCOSE UR QL: NEGATIVE
HCT VFR BLD AUTO: 47 % (ref 37.5–51)
HCV AB S/CO SERPL IA: <0.1 S/CO RATIO (ref 0–0.9)
HDLC SERPL-MCNC: 44 MG/DL (ref 40–60)
HGB BLD-MCNC: 15 G/DL (ref 13–17.7)
HGB UR QL STRIP: NEGATIVE
IMM GRANULOCYTES # BLD AUTO: 0.04 10*3/MM3 (ref 0–0.05)
IMM GRANULOCYTES NFR BLD AUTO: 0.9 % (ref 0–0.5)
KETONES UR QL STRIP: NEGATIVE
LDLC SERPL CALC-MCNC: 116 MG/DL (ref 0–100)
LDLC/HDLC SERPL: 2.63 {RATIO}
LEUKOCYTE ESTERASE UR QL STRIP: NEGATIVE
LYMPHOCYTES # BLD AUTO: 1.26 10*3/MM3 (ref 0.7–3.1)
LYMPHOCYTES NFR BLD AUTO: 29.9 % (ref 19.6–45.3)
MCH RBC QN AUTO: 28.8 PG (ref 26.6–33)
MCHC RBC AUTO-ENTMCNC: 31.9 G/DL (ref 31.5–35.7)
MCV RBC AUTO: 90.4 FL (ref 79–97)
MICRO URNS: NORMAL
MICRO URNS: NORMAL
MONOCYTES # BLD AUTO: 0.34 10*3/MM3 (ref 0.1–0.9)
MONOCYTES NFR BLD AUTO: 8.1 % (ref 5–12)
MUCOUS THREADS URNS QL MICRO: PRESENT /HPF
NEUTROPHILS # BLD AUTO: 2.49 10*3/MM3 (ref 1.7–7)
NEUTROPHILS NFR BLD AUTO: 58.9 % (ref 42.7–76)
NITRITE UR QL STRIP: NEGATIVE
NRBC BLD AUTO-RTO: 0 /100 WBC (ref 0–0.2)
PH UR STRIP: 7 [PH] (ref 5–7.5)
PLATELET # BLD AUTO: 208 10*3/MM3 (ref 140–450)
POTASSIUM SERPL-SCNC: 5 MMOL/L (ref 3.5–5.2)
PROT SERPL-MCNC: 6.8 G/DL (ref 6–8.5)
PROT UR QL STRIP: NEGATIVE
PSA SERPL-MCNC: 1.92 NG/ML (ref 0–4)
RBC # BLD AUTO: 5.2 10*6/MM3 (ref 4.14–5.8)
RBC #/AREA URNS HPF: NORMAL /HPF
SODIUM SERPL-SCNC: 144 MMOL/L (ref 136–145)
SP GR UR: 1.02 (ref 1–1.03)
TRIGL SERPL-MCNC: 86 MG/DL (ref 0–150)
TSH SERPL DL<=0.005 MIU/L-ACNC: 0.8 MIU/ML (ref 0.27–4.2)
URINALYSIS REFLEX: NORMAL
UROBILINOGEN UR STRIP-MCNC: 0.2 MG/DL (ref 0.2–1)
VLDLC SERPL CALC-MCNC: 17.2 MG/DL
WBC # BLD AUTO: 4.22 10*3/MM3 (ref 3.4–10.8)
WBC #/AREA URNS HPF: NORMAL /HPF

## 2019-09-20 ENCOUNTER — OFFICE VISIT (OUTPATIENT)
Dept: INTERNAL MEDICINE | Facility: CLINIC | Age: 59
End: 2019-09-20

## 2019-09-20 VITALS
SYSTOLIC BLOOD PRESSURE: 128 MMHG | HEIGHT: 67 IN | HEART RATE: 77 BPM | DIASTOLIC BLOOD PRESSURE: 88 MMHG | WEIGHT: 149 LBS | OXYGEN SATURATION: 98 % | BODY MASS INDEX: 23.39 KG/M2 | TEMPERATURE: 98.8 F

## 2019-09-20 DIAGNOSIS — Z00.00 HEALTHCARE MAINTENANCE: ICD-10-CM

## 2019-09-20 DIAGNOSIS — I10 ESSENTIAL HYPERTENSION: Primary | ICD-10-CM

## 2019-09-20 DIAGNOSIS — E78.5 DYSLIPIDEMIA: ICD-10-CM

## 2019-09-20 PROCEDURE — 99213 OFFICE O/P EST LOW 20 MIN: CPT | Performed by: INTERNAL MEDICINE

## 2019-09-20 NOTE — PROGRESS NOTES
"Hypertension (his b/p-machine- 137/94)      HPI  Keaton Holloway is a 59 y.o. male RTC In f/u:   1. Hx of elevated blood pressure without dx of HTN --> new dx essential hypertension last visit -  has been keeping pressure log at home, has cuff with him today. Has good numbers. Feels like has average on machine of \"124/83\".  Had one event when started med got some dizziness and a single low number after played golf.  Had felt dizzy when standing up from reading putt and then checked pressure when got home.  Generally tries to keep well hydrated.  Has habit of staying hydrated with hx of kidney stones.    \"I try to be more conscious\". Overall has been fine since. No generalized dizziness. Has lost 5# after noted had gained some weight, doing so along with wife who is intentionally working on weight loss.  Intentional weight loss for pt.   Right now feels well and thinks pressure is well managed.    2. Verrucous/ wart lesion - on L ring finger, cryotherapy applied last visit.  Is better but still has small area present. Will see derm in a few weeks and wants them to use cryo there, declines more here.   3. HM - had flu shot and first Shingrix at pharmacy last week.     Review of Systems   Constitution: Positive for weight loss (~5#, intentional). Negative for chills and fever.   Cardiovascular: Negative for chest pain, dyspnea on exertion, irregular heartbeat, leg swelling, near-syncope, palpitations and syncope.   Respiratory: Negative for shortness of breath.    Skin: Positive for suspicious lesions (on L ring finger, partially resolved. ).   Neurological: Positive for dizziness ( a few events early in course of B.P med tx, resolved).   Psychiatric/Behavioral: The patient is not nervous/anxious.         Admits some stress with work right now.          The following portions of the patient's history were reviewed and updated as appropriate: allergies, current medications, past medical history, past social history and " "problem list.      Current Outpatient Medications:   •  ALPRAZolam (XANAX) 0.5 MG tablet, Take 0.5 mg by mouth 2 (Two) Times a Day As Needed for Anxiety (take 1/2 to 1 tablet by mouth daily as needed for insomnia or anxiety)., Disp: , Rfl:   •  cetirizine (zyrTEC) 10 MG tablet, Take 10 mg by mouth Daily As Needed., Disp: , Rfl:   •  escitalopram (LEXAPRO) 10 MG tablet, Take 1 tablet by mouth Daily., Disp: 90 tablet, Rfl: 3  •  fluticasone (VERAMYST) 27.5 MCG/SPRAY nasal spray, 2 sprays into the nostril(s) as directed by provider Daily., Disp: , Rfl:   •  losartan (COZAAR) 25 MG tablet, Take 1 tablet by mouth Daily., Disp: 30 tablet, Rfl: 3  •  Saw Palmetto 1000 MG capsule, Take  by mouth., Disp: , Rfl:     Vitals:    09/20/19 0837   BP: 128/88   Pulse: 77   Temp: 98.8 °F (37.1 °C)   SpO2: 98%   Weight: 67.6 kg (149 lb)   Height: 170.2 cm (67\")         Physical Exam   Constitutional: He is oriented to person, place, and time. He appears well-developed and well-nourished. No distress.   HENT:   Head: Normocephalic and atraumatic.   Eyes: Pupils are equal, round, and reactive to light.   Neck: Normal range of motion. Neck supple. Carotid bruit is not present.   Cardiovascular: Normal rate, regular rhythm and normal heart sounds.   Pulses:       Carotid pulses are 2+ on the right side, and 2+ on the left side.       Radial pulses are 2+ on the right side, and 2+ on the left side.   Pulmonary/Chest: Effort normal and breath sounds normal. No respiratory distress. He has no wheezes. He has no rales.   Musculoskeletal: He exhibits no edema.   Neurological: He is alert and oriented to person, place, and time. No cranial nerve deficit.   Skin: No rash noted.        Psychiatric: He has a normal mood and affect. His behavior is normal.   Vitals reviewed.      Assessment/ Plan  Diagnoses and all orders for this visit:    Essential hypertension    Healthcare maintenance    Dyslipidemia        Return in about 6 months (around " 3/20/2020) for Recheck.      Discussion:]Keaton Holloway is a 59 y.o. male RTC In f/u:   1. Hx of elevated blood pressure without dx of HTN --> new dx essential hypertension last visit -  well controlled on single low dose agent.  Good numbers on home log; home cuff reasonably correlates today.  Rare dizziness events early in tx course, now resolved. Pt is making and diet and exercise changes and will need to watch pressure closely. Call if any low B/P or return of any sx.  Trend BMP next labs.  C/W same med.  2. Dyslipidemia - overall numbers not terrible, but do note that pt has 10yr CR 9.9%.  Pt has few true CV risks and I think the calculation is a bit of an overestimate based on male sex and age.  I do not think pt is statin candidate, but will consider checking hsCRP next labs to further assess. Trend HDL and LDL next labs as pt is working on adding more exercise and diet mods.    3. HM - Flu and Shingrix #1 completed at pharmacy; reviewed all CPE labs with pt today in office.     RTC 6 months, F labs prior (BMP, LDL, HDL, hsCRP)

## 2019-11-13 RX ORDER — LOSARTAN POTASSIUM 25 MG/1
TABLET ORAL
Qty: 90 TABLET | Refills: 1 | Status: SHIPPED | OUTPATIENT
Start: 2019-11-13 | End: 2020-05-07

## 2019-12-20 DIAGNOSIS — F41.1 GENERALIZED ANXIETY DISORDER: ICD-10-CM

## 2019-12-20 RX ORDER — ESCITALOPRAM OXALATE 10 MG/1
10 TABLET ORAL DAILY
Qty: 90 TABLET | Refills: 3 | Status: SHIPPED | OUTPATIENT
Start: 2019-12-20 | End: 2020-12-17

## 2020-03-11 DIAGNOSIS — E78.89 LIPIDS ABNORMAL: ICD-10-CM

## 2020-03-11 DIAGNOSIS — I10 ESSENTIAL HYPERTENSION: Primary | ICD-10-CM

## 2020-03-24 LAB
BUN SERPL-MCNC: 18 MG/DL (ref 8–23)
BUN/CREAT SERPL: 15.5 (ref 7–25)
CALCIUM SERPL-MCNC: 9 MG/DL (ref 8.6–10.5)
CHLORIDE SERPL-SCNC: 104 MMOL/L (ref 98–107)
CO2 SERPL-SCNC: 26.8 MMOL/L (ref 22–29)
CREAT SERPL-MCNC: 1.16 MG/DL (ref 0.76–1.27)
CRP SERPL HS-MCNC: 0.54 MG/L (ref 0–3)
GLUCOSE SERPL-MCNC: 86 MG/DL (ref 65–99)
HDLC SERPL-MCNC: 47 MG/DL (ref 40–60)
LDLC SERPL DIRECT ASSAY-MCNC: 111 MG/DL (ref 0–100)
POTASSIUM SERPL-SCNC: 4.2 MMOL/L (ref 3.5–5.2)
SODIUM SERPL-SCNC: 142 MMOL/L (ref 136–145)

## 2020-03-30 ENCOUNTER — TELEMEDICINE (OUTPATIENT)
Dept: INTERNAL MEDICINE | Facility: CLINIC | Age: 60
End: 2020-03-30

## 2020-03-30 DIAGNOSIS — F41.1 GENERALIZED ANXIETY DISORDER: ICD-10-CM

## 2020-03-30 DIAGNOSIS — I10 ESSENTIAL HYPERTENSION: Primary | ICD-10-CM

## 2020-03-30 DIAGNOSIS — E78.5 DYSLIPIDEMIA: ICD-10-CM

## 2020-03-30 DIAGNOSIS — Z00.00 HEALTHCARE MAINTENANCE: ICD-10-CM

## 2020-03-30 PROCEDURE — 99214 OFFICE O/P EST MOD 30 MIN: CPT | Performed by: INTERNAL MEDICINE

## 2020-03-30 NOTE — PROGRESS NOTES
"Hypertension and Hyperlipidemia      HPI  Keaton Holloway is a 60 y.o. male RTC in f/u via telemedicine via video link:   1. Hx of elevated blood pressure without dx of HTN --> new dx essential hypertension in the Fall -  has been checking at home.  Getting \"110's/ 90 or something like that... Maybe it is below 90\".  Has moved recently and has not located the cuff since moving.  \"It was good\".  No side effects with med noted.  Has been 'working like hell on this house... 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  Is really active.   2. Dyslipidemia - has been really active on house.  Is working hard physically and does plan more work and exercise over time.   Has not seen lab numbers yet.   overall numbers not terrible, but do note that pt has 10yr CR 9.9%.  Pt has few   3. CHRIS vs. Adjustment D/O with anxiety - \"the only anxiety is still work\".  Feels like will retire next year and is eager to move in that direction.  Home life is good, feels like moving and all has been good.   4. HM - needs to get Hep A #2; \"I had both Shingrix\" at pharmacy    Review of Systems   Constitution: Negative for chills, fever and malaise/fatigue.   Cardiovascular: Negative for chest pain and dyspnea on exertion.   Respiratory: Negative for shortness of breath.    Neurological: Negative for dizziness and light-headedness.   Psychiatric/Behavioral: Negative for depression. The patient is nervous/anxious (only with work, intermittent with work issues).        The following portions of the patient's history were reviewed and updated as appropriate: allergies, current medications, past medical history, past social history and problem list.      Current Outpatient Medications:   •  ALPRAZolam (XANAX) 0.5 MG tablet, Take 0.5 mg by mouth 2 (Two) Times a Day As Needed for Anxiety (take 1/2 to 1 tablet by mouth daily as needed for insomnia or anxiety)., Disp: , Rfl:   •  cetirizine (zyrTEC) 10 MG tablet, Take 10 mg by mouth Daily As " Needed., Disp: , Rfl:   •  escitalopram (LEXAPRO) 10 MG tablet, Take 1 tablet by mouth Daily., Disp: 90 tablet, Rfl: 3  •  fluticasone (VERAMYST) 27.5 MCG/SPRAY nasal spray, 2 sprays into the nostril(s) as directed by provider Daily., Disp: , Rfl:   •  losartan (COZAAR) 25 MG tablet, TAKE 1 TABLET BY MOUTH EVERY DAY, Disp: 90 tablet, Rfl: 1  •  Saw Palmetto 1000 MG capsule, Take  by mouth., Disp: , Rfl:     There were no vitals filed for this visit.  There is no height or weight on file to calculate BMI.      Physical Exam   Constitutional: He is oriented to person, place, and time. He appears well-developed and well-nourished. No distress.   Pulmonary/Chest: Effort normal. No respiratory distress.   Neurological: He is alert and oriented to person, place, and time.   Psychiatric: He has a normal mood and affect. His behavior is normal. Thought content normal.   Vitals reviewed.      Assessment/ Plan  Diagnoses and all orders for this visit:    Essential hypertension    Generalized anxiety disorder    Dyslipidemia    Healthcare maintenance        Return in about 6 months (around 9/30/2020) for Annual physical.      Discussion:  Keaton Hololway is a 60 y.o. male RTC in f/u via telemedicine via video link:   1. Hx of elevated blood pressure without dx of HTN --> new dx essential hypertension in the Fall -  controlled on home log on single low dose agent.  Pt will c/w log and call with numbers once gets B/P cuff unpacked from recent move. 2. Dyslipidemia - overall numbers not terrible, but 10yr CR 9.9%.  hsCRP on labs today is low and I think this breaks the tie for us NOT in favor of meds. Will remain off statin meds and pt to c/w activity and good diet.    3. CHRIS vs. Adjustment D/O with anxiety - mostly job stress related. Well managed on Lexapro daily.  Xanax use remains very rare at this time. Add more exercise as able after recent move.   4. HM -  Hep A #2 due at pharmacy; Shingrsimón completed    RTC 6 months CPE, F  labs prior

## 2020-05-07 RX ORDER — LOSARTAN POTASSIUM 25 MG/1
TABLET ORAL
Qty: 90 TABLET | Refills: 2 | Status: SHIPPED | OUTPATIENT
Start: 2020-05-07 | End: 2021-01-18

## 2020-05-09 ENCOUNTER — HOSPITAL ENCOUNTER (EMERGENCY)
Facility: HOSPITAL | Age: 60
Discharge: HOME | End: 2020-05-09
Attending: EMERGENCY MEDICINE | Admitting: EMERGENCY MEDICINE

## 2020-05-09 ENCOUNTER — APPOINTMENT (OUTPATIENT)
Dept: CT IMAGING | Facility: HOSPITAL | Age: 60
End: 2020-05-09

## 2020-05-09 VITALS
RESPIRATION RATE: 18 BRPM | HEIGHT: 67 IN | SYSTOLIC BLOOD PRESSURE: 132 MMHG | TEMPERATURE: 98.8 F | OXYGEN SATURATION: 96 % | HEART RATE: 92 BPM | DIASTOLIC BLOOD PRESSURE: 94 MMHG | WEIGHT: 142 LBS | BODY MASS INDEX: 22.29 KG/M2

## 2020-05-09 DIAGNOSIS — N28.1 RENAL CYST, RIGHT: ICD-10-CM

## 2020-05-09 DIAGNOSIS — N20.0 KIDNEY STONE ON LEFT SIDE: ICD-10-CM

## 2020-05-09 DIAGNOSIS — N20.1 LEFT URETERAL STONE: Primary | ICD-10-CM

## 2020-05-09 LAB
ALBUMIN SERPL-MCNC: 4.7 G/DL (ref 3.5–5.2)
ALBUMIN/GLOB SERPL: 1.7 G/DL
ALP SERPL-CCNC: 51 U/L (ref 39–117)
ALT SERPL W P-5'-P-CCNC: 25 U/L (ref 1–41)
ANION GAP SERPL CALCULATED.3IONS-SCNC: 12.5 MMOL/L (ref 5–15)
AST SERPL-CCNC: 22 U/L (ref 1–40)
BACTERIA UR QL AUTO: ABNORMAL /HPF
BASOPHILS # BLD AUTO: 0.02 10*3/MM3 (ref 0–0.2)
BASOPHILS NFR BLD AUTO: 0.2 % (ref 0–1.5)
BILIRUB SERPL-MCNC: 0.9 MG/DL (ref 0.2–1.2)
BILIRUB UR QL STRIP: NEGATIVE
BUN BLD-MCNC: 19 MG/DL (ref 8–23)
BUN/CREAT SERPL: 15.7 (ref 7–25)
CALCIUM SPEC-SCNC: 9.9 MG/DL (ref 8.6–10.5)
CHLORIDE SERPL-SCNC: 104 MMOL/L (ref 98–107)
CLARITY UR: CLEAR
CO2 SERPL-SCNC: 25.5 MMOL/L (ref 22–29)
COLOR UR: YELLOW
CREAT BLD-MCNC: 1.21 MG/DL (ref 0.76–1.27)
DEPRECATED RDW RBC AUTO: 40.7 FL (ref 37–54)
EOSINOPHIL # BLD AUTO: 0.04 10*3/MM3 (ref 0–0.4)
EOSINOPHIL NFR BLD AUTO: 0.5 % (ref 0.3–6.2)
ERYTHROCYTE [DISTWIDTH] IN BLOOD BY AUTOMATED COUNT: 13.2 % (ref 12.3–15.4)
GFR SERPL CREATININE-BSD FRML MDRD: 61 ML/MIN/1.73
GLOBULIN UR ELPH-MCNC: 2.7 GM/DL
GLUCOSE BLD-MCNC: 139 MG/DL (ref 65–99)
GLUCOSE UR STRIP-MCNC: NEGATIVE MG/DL
HCT VFR BLD AUTO: 44.8 % (ref 37.5–51)
HGB BLD-MCNC: 15.4 G/DL (ref 13–17.7)
HGB UR QL STRIP.AUTO: ABNORMAL
HYALINE CASTS UR QL AUTO: ABNORMAL /LPF
IMM GRANULOCYTES # BLD AUTO: 0.03 10*3/MM3 (ref 0–0.05)
IMM GRANULOCYTES NFR BLD AUTO: 0.4 % (ref 0–0.5)
KETONES UR QL STRIP: NEGATIVE
LEUKOCYTE ESTERASE UR QL STRIP.AUTO: NEGATIVE
LIPASE SERPL-CCNC: 42 U/L (ref 13–60)
LYMPHOCYTES # BLD AUTO: 1.51 10*3/MM3 (ref 0.7–3.1)
LYMPHOCYTES NFR BLD AUTO: 18.2 % (ref 19.6–45.3)
MCH RBC QN AUTO: 29.4 PG (ref 26.6–33)
MCHC RBC AUTO-ENTMCNC: 34.4 G/DL (ref 31.5–35.7)
MCV RBC AUTO: 85.5 FL (ref 79–97)
MONOCYTES # BLD AUTO: 0.51 10*3/MM3 (ref 0.1–0.9)
MONOCYTES NFR BLD AUTO: 6.2 % (ref 5–12)
NEUTROPHILS # BLD AUTO: 6.17 10*3/MM3 (ref 1.7–7)
NEUTROPHILS NFR BLD AUTO: 74.5 % (ref 42.7–76)
NITRITE UR QL STRIP: NEGATIVE
NRBC BLD AUTO-RTO: 0 /100 WBC (ref 0–0.2)
PH UR STRIP.AUTO: 6.5 [PH] (ref 5–8)
PLATELET # BLD AUTO: 244 10*3/MM3 (ref 140–450)
PMV BLD AUTO: 10.2 FL (ref 6–12)
POTASSIUM BLD-SCNC: 3.7 MMOL/L (ref 3.5–5.2)
PROT SERPL-MCNC: 7.4 G/DL (ref 6–8.5)
PROT UR QL STRIP: NEGATIVE
RBC # BLD AUTO: 5.24 10*6/MM3 (ref 4.14–5.8)
RBC # UR: ABNORMAL /HPF
REF LAB TEST METHOD: ABNORMAL
SODIUM BLD-SCNC: 142 MMOL/L (ref 136–145)
SP GR UR STRIP: 1.02 (ref 1–1.03)
SQUAMOUS #/AREA URNS HPF: ABNORMAL /HPF
UROBILINOGEN UR QL STRIP: ABNORMAL
WBC NRBC COR # BLD: 8.28 10*3/MM3 (ref 3.4–10.8)
WBC UR QL AUTO: ABNORMAL /HPF

## 2020-05-09 PROCEDURE — 96374 THER/PROPH/DIAG INJ IV PUSH: CPT

## 2020-05-09 PROCEDURE — 83690 ASSAY OF LIPASE: CPT | Performed by: EMERGENCY MEDICINE

## 2020-05-09 PROCEDURE — 25010000002 ONDANSETRON PER 1 MG

## 2020-05-09 PROCEDURE — 36415 COLL VENOUS BLD VENIPUNCTURE: CPT

## 2020-05-09 PROCEDURE — 25010000002 HYDROMORPHONE PER 4 MG: Performed by: EMERGENCY MEDICINE

## 2020-05-09 PROCEDURE — 81001 URINALYSIS AUTO W/SCOPE: CPT | Performed by: EMERGENCY MEDICINE

## 2020-05-09 PROCEDURE — 96375 TX/PRO/DX INJ NEW DRUG ADDON: CPT

## 2020-05-09 PROCEDURE — 99284 EMERGENCY DEPT VISIT MOD MDM: CPT

## 2020-05-09 PROCEDURE — 96361 HYDRATE IV INFUSION ADD-ON: CPT

## 2020-05-09 PROCEDURE — 25010000002 KETOROLAC TROMETHAMINE PER 15 MG: Performed by: EMERGENCY MEDICINE

## 2020-05-09 PROCEDURE — 74176 CT ABD & PELVIS W/O CONTRAST: CPT

## 2020-05-09 PROCEDURE — 80053 COMPREHEN METABOLIC PANEL: CPT | Performed by: EMERGENCY MEDICINE

## 2020-05-09 PROCEDURE — 85025 COMPLETE CBC W/AUTO DIFF WBC: CPT | Performed by: EMERGENCY MEDICINE

## 2020-05-09 RX ORDER — HYDROCODONE BITARTRATE AND ACETAMINOPHEN 5; 325 MG/1; MG/1
1 TABLET ORAL ONCE
Status: COMPLETED | OUTPATIENT
Start: 2020-05-09 | End: 2020-05-09

## 2020-05-09 RX ORDER — HYDROCODONE BITARTRATE AND ACETAMINOPHEN 5; 325 MG/1; MG/1
1 TABLET ORAL EVERY 6 HOURS PRN
Qty: 12 TABLET | Refills: 0 | Status: SHIPPED | OUTPATIENT
Start: 2020-05-09 | End: 2020-07-10

## 2020-05-09 RX ORDER — HYDROMORPHONE HYDROCHLORIDE 1 MG/ML
0.5 INJECTION, SOLUTION INTRAMUSCULAR; INTRAVENOUS; SUBCUTANEOUS ONCE
Status: COMPLETED | OUTPATIENT
Start: 2020-05-09 | End: 2020-05-09

## 2020-05-09 RX ORDER — KETOROLAC TROMETHAMINE 15 MG/ML
15 INJECTION, SOLUTION INTRAMUSCULAR; INTRAVENOUS ONCE
Status: COMPLETED | OUTPATIENT
Start: 2020-05-09 | End: 2020-05-09

## 2020-05-09 RX ORDER — ONDANSETRON 2 MG/ML
INJECTION INTRAMUSCULAR; INTRAVENOUS
Status: COMPLETED
Start: 2020-05-09 | End: 2020-05-09

## 2020-05-09 RX ORDER — ONDANSETRON 8 MG/1
8 TABLET, ORALLY DISINTEGRATING ORAL EVERY 8 HOURS PRN
Qty: 15 TABLET | Refills: 0 | Status: SHIPPED | OUTPATIENT
Start: 2020-05-09 | End: 2020-12-02

## 2020-05-09 RX ORDER — IBUPROFEN 600 MG/1
600 TABLET ORAL EVERY 6 HOURS PRN
Qty: 30 TABLET | Refills: 0 | Status: SHIPPED | OUTPATIENT
Start: 2020-05-09 | End: 2020-07-10

## 2020-05-09 RX ORDER — TAMSULOSIN HYDROCHLORIDE 0.4 MG/1
1 CAPSULE ORAL DAILY
Qty: 14 CAPSULE | Refills: 0 | Status: SHIPPED | OUTPATIENT
Start: 2020-05-09 | End: 2020-05-23

## 2020-05-09 RX ORDER — HYDROMORPHONE HYDROCHLORIDE 1 MG/ML
0.5 INJECTION, SOLUTION INTRAMUSCULAR; INTRAVENOUS; SUBCUTANEOUS ONCE AS NEEDED
Status: DISCONTINUED | OUTPATIENT
Start: 2020-05-09 | End: 2020-05-10 | Stop reason: HOSPADM

## 2020-05-09 RX ORDER — ONDANSETRON 2 MG/ML
8 INJECTION INTRAMUSCULAR; INTRAVENOUS ONCE
Status: COMPLETED | OUTPATIENT
Start: 2020-05-09 | End: 2020-05-09

## 2020-05-09 RX ADMIN — ONDANSETRON 4 MG: 2 INJECTION INTRAMUSCULAR; INTRAVENOUS at 21:12

## 2020-05-09 RX ADMIN — SODIUM CHLORIDE 1000 ML: 9 INJECTION, SOLUTION INTRAVENOUS at 20:48

## 2020-05-09 RX ADMIN — HYDROMORPHONE HYDROCHLORIDE 0.5 MG: 1 INJECTION, SOLUTION INTRAMUSCULAR; INTRAVENOUS; SUBCUTANEOUS at 20:48

## 2020-05-09 RX ADMIN — KETOROLAC TROMETHAMINE 15 MG: 15 INJECTION, SOLUTION INTRAMUSCULAR; INTRAVENOUS at 21:23

## 2020-05-09 RX ADMIN — HYDROCODONE BITARTRATE AND ACETAMINOPHEN 1 TABLET: 5; 325 TABLET ORAL at 23:04

## 2020-05-10 NOTE — ED PROVIDER NOTES
EMERGENCY DEPARTMENT ENCOUNTER    Room Number:  41/41  Date of encounter:  5/9/2020  PCP: Tom Garza MD    HPI:  Context: Keaton Holloway is a 60 y.o. male who presents to the ED c/o chief complaint of left flank pain.  Patient states he initially had some mild left-sided abdominal pain yesterday associated with nausea but that was intermittent, felt like he might be developing a stomach bug.  Patient states symptoms continued today but he began to experience more severe pain this evening his left flank.  Pain is described as dull, patient did state that he had abdominal pain that was migratory and left side of his abdomen but denies any abdominal pain at present.  Patient reports hematuria this morning, no dysuria, no urinary hesitancy or urgency.  Patient has had nausea, no emesis.  Patient denies any fever shakes chills or night sweats.  Patient states this pain is similar to the kidney stones he has had in the past.  Patient states prior to onset of pain, was at baseline without complaint.  Patient denies any recent fevers, cough, shortness of breath, lost of taste or smell.  Patient denies any recent travel.  No exposure to any known or suspected CoVID-19 infections.      MEDICAL HISTORY REVIEW  Reviewed in EPIC    PAST MEDICAL HISTORY  Active Ambulatory Problems     Diagnosis Date Noted   • Generalized anxiety disorder 02/23/2016   • Environmental and seasonal allergies 08/08/2019   • Hx of acute prostatitis 08/08/2019   • Diverticulosis of large intestine without hemorrhage 08/08/2019   • History of colon polyps 08/08/2019   • Essential hypertension 08/19/2019     Resolved Ambulatory Problems     Diagnosis Date Noted   • Blood pressure elevated without history of HTN 08/08/2019   • Diverticulosis of large intestine without hemorrhage 08/08/2019     Past Medical History:   Diagnosis Date   • Environmental allergies 8/8/2019   • Plantar warts    • Prostatitis, acute    • Shingles        PAST SURGICAL  "HISTORY  Past Surgical History:   Procedure Laterality Date   • COLONOSCOPY  11/30/2012    Within normal limits. 3mm hyperplastic polyp; Dr. Miller. TV adenoma in 03/2010   • COLONOSCOPY N/A 6/30/2017    Procedure: COLONOSCOPY into cecum and TI;  Surgeon: Van Miller MD;  Location: [facility] ENDOSCOPY;  Service:    • ENDOSCOPY  03/2010    Mild Gastritis; Dr. Miller   • MOLE REMOVAL      benign       FAMILY HISTORY  Family History   Problem Relation Age of Onset   • Heart attack Mother    • Osteoarthritis Mother    • Osteoarthritis Father    • Hypertension Father    • Hypertension Sister    • Lung cancer Paternal Grandmother    • Cerebral aneurysm Paternal Grandfather         hemorrhaged       SOCIAL HISTORY  Social History     Socioeconomic History   • Marital status:      Spouse name: Not on file   • Number of children: Not on file   • Years of education: Not on file   • Highest education level: Not on file   Occupational History   • Occupation:      Employer: [employer]   Tobacco Use   • Smoking status: Never Smoker   • Smokeless tobacco: Never Used   Substance and Sexual Activity   • Alcohol use: Yes     Alcohol/week: 2.0 standard drinks     Types: 2 Standard drinks or equivalent per week     Comment: 1-2 drinks/ week   • Drug use: No   • Sexual activity: Yes     Partners: Female     Comment: wife only; no hx of STD's   Lifestyle   • Physical activity:     Days per week: 1 day     Minutes per session: 60 min   • Stress: Not on file   Social History Narrative    Diet - overall healthy, \"reasonably\"; eats fruits and vegetables    Exercise - variable with golf and weights    Caffeine - 1 -2 cup coffee in AM; soda at lunch       ALLERGIES  Patient has no known allergies.    The patient's allergies have been reviewed    REVIEW OF SYSTEMS  All systems reviewed and negative except for those discussed in HPI.     PHYSICAL EXAM  I have reviewed the triage vital signs and nursing " notes.  ED Triage Vitals [05/09/20 2022]   Temp Heart Rate Resp BP SpO2   98.8 °F (37.1 °C) 79 18 -- 100 %      Temp src Heart Rate Source Patient Position BP Location FiO2 (%)   Tympanic -- -- -- --       GENERAL: No acute distress  HENT: NCAT, PERRL, Nares patent  EYES: no scleral icterus  NECK: trachea midline, no ROM limitations  CV: regular rhythm, regular rate  RESPIRATORY: normal effort  ABDOMEN: soft, nondistended, nontender to palpation, no CVA tenderness bilaterally  : deferred  MUSCULOSKELETAL: no deformity  NEURO: alert, moves all extremities, follows commands  SKIN: warm, dry    LAB RESULTS  Recent Results (from the past 24 hour(s))   Comprehensive Metabolic Panel    Collection Time: 05/09/20  8:49 PM   Result Value Ref Range    Glucose 139 (H) 65 - 99 mg/dL    BUN 19 8 - 23 mg/dL    Creatinine 1.21 0.76 - 1.27 mg/dL    Sodium 142 136 - 145 mmol/L    Potassium 3.7 3.5 - 5.2 mmol/L    Chloride 104 98 - 107 mmol/L    CO2 25.5 22.0 - 29.0 mmol/L    Calcium 9.9 8.6 - 10.5 mg/dL    Total Protein 7.4 6.0 - 8.5 g/dL    Albumin 4.70 3.50 - 5.20 g/dL    ALT (SGPT) 25 1 - 41 U/L    AST (SGOT) 22 1 - 40 U/L    Alkaline Phosphatase 51 39 - 117 U/L    Total Bilirubin 0.9 0.2 - 1.2 mg/dL    eGFR Non African Amer 61 >60 mL/min/1.73    Globulin 2.7 gm/dL    A/G Ratio 1.7 g/dL    BUN/Creatinine Ratio 15.7 7.0 - 25.0    Anion Gap 12.5 5.0 - 15.0 mmol/L   Lipase    Collection Time: 05/09/20  8:49 PM   Result Value Ref Range    Lipase 42 13 - 60 U/L   Urinalysis With Microscopic If Indicated (No Culture) - Urine, Clean Catch    Collection Time: 05/09/20  8:49 PM   Result Value Ref Range    Color, UA Yellow Yellow, Straw    Appearance, UA Clear Clear    pH, UA 6.5 5.0 - 8.0    Specific Gravity, UA 1.018 1.005 - 1.030    Glucose, UA Negative Negative    Ketones, UA Negative Negative    Bilirubin, UA Negative Negative    Blood, UA Large (3+) (A) Negative    Protein, UA Negative Negative    Leuk Esterase, UA Negative  Negative    Nitrite, UA Negative Negative    Urobilinogen, UA 0.2 E.U./dL 0.2 - 1.0 E.U./dL   CBC Auto Differential    Collection Time: 05/09/20  8:49 PM   Result Value Ref Range    WBC 8.28 3.40 - 10.80 10*3/mm3    RBC 5.24 4.14 - 5.80 10*6/mm3    Hemoglobin 15.4 13.0 - 17.7 g/dL    Hematocrit 44.8 37.5 - 51.0 %    MCV 85.5 79.0 - 97.0 fL    MCH 29.4 26.6 - 33.0 pg    MCHC 34.4 31.5 - 35.7 g/dL    RDW 13.2 12.3 - 15.4 %    RDW-SD 40.7 37.0 - 54.0 fl    MPV 10.2 6.0 - 12.0 fL    Platelets 244 140 - 450 10*3/mm3    Neutrophil % 74.5 42.7 - 76.0 %    Lymphocyte % 18.2 (L) 19.6 - 45.3 %    Monocyte % 6.2 5.0 - 12.0 %    Eosinophil % 0.5 0.3 - 6.2 %    Basophil % 0.2 0.0 - 1.5 %    Immature Grans % 0.4 0.0 - 0.5 %    Neutrophils, Absolute 6.17 1.70 - 7.00 10*3/mm3    Lymphocytes, Absolute 1.51 0.70 - 3.10 10*3/mm3    Monocytes, Absolute 0.51 0.10 - 0.90 10*3/mm3    Eosinophils, Absolute 0.04 0.00 - 0.40 10*3/mm3    Basophils, Absolute 0.02 0.00 - 0.20 10*3/mm3    Immature Grans, Absolute 0.03 0.00 - 0.05 10*3/mm3    nRBC 0.0 0.0 - 0.2 /100 WBC   Urinalysis, Microscopic Only - Urine, Clean Catch    Collection Time: 05/09/20  8:49 PM   Result Value Ref Range    RBC, UA Too Numerous to Count (A) None Seen, 0-2 /HPF    WBC, UA 0-2 None Seen, 0-2 /HPF    Bacteria, UA None Seen None Seen /HPF    Squamous Epithelial Cells, UA 0-2 None Seen, 0-2 /HPF    Hyaline Casts, UA 0-2 None Seen /LPF    Methodology Automated Microscopy        I ordered the above labs and reviewed the results.    RADIOLOGY  Ct Abdomen Pelvis Without Contrast    Result Date: 5/9/2020  CT ABDOMEN PELVIS WO CONTRAST-  CLINICAL HISTORY: Left flank pain. History of kidney stones.  TECHNIQUE: Spiral CT images were acquired through the abdomen and pelvis with no oral or IV contrast and were reconstructed in 3 mm thick axial slices.  Radiation dose reduction techniques were utilized, including automated exposure control and exposure modulation based on body size.   COMPARISON: CT scan of the abdomen and pelvis dated 02/08/2013.  FINDINGS: There is an approximately 3 mm diameter obstructing calculus in the proximal one third of the left ureter producing mild left hydronephrosis. An approximately 6 x 3 mm diameter nonobstructing calculus is also noted in the upper pole of the left kidney. Both of these calculi are new since the preceding CT scan. A tiny 1 mm diameter faintly radiopaque nonobstructing calculus is noted in the midpole of the right kidney. No other radiopaque calculi are present within either renal collecting system. There is a 3.2 cm diameter simple cyst in the lateral aspect of the right kidney. There is a small simple cyst in the left lobe of the liver. The liver is otherwise unremarkable. The spleen and pancreas and adrenal glands appear within normal limits. The stomach and small bowel appear normal. There are numerous diverticuli in the sigmoid colon. There is no evidence of diverticulitis.      Tiny approximately 3 mm diameter obstructing calculus in the proximal left ureter resulting in mild left hydronephrosis. There is also an approximately 6 mm diameter nonobstructing left upper pole renal calculus. Tiny mid pole right renal calculus. Right renal cyst. Moderate sigmoid diverticulosis without evidence of diverticulitis.  This report was finalized on 5/9/2020 10:35 PM by Dr. Brendan Flood M.D.        I ordered the above noted radiological studies. I reviewed the images and results. I agree with the radiologist interpretation.    PROCEDURES  Procedures    MEDICATIONS GIVEN IN ER  Medications   HYDROmorphone (DILAUDID) injection 0.5 mg (has no administration in time range)   HYDROcodone-acetaminophen (NORCO) 5-325 MG per tablet 1 tablet (has no administration in time range)   sodium chloride 0.9 % bolus 1,000 mL (0 mL Intravenous Stopped 5/9/20 2226)   HYDROmorphone (DILAUDID) injection 0.5 mg (0.5 mg Intravenous Given 5/9/20 2048)   ondansetron (ZOFRAN)  injection 8 mg (4 mg Intravenous Given 5/9/20 2112)   ketorolac (TORADOL) injection 15 mg (15 mg Intravenous Given 5/9/20 2123)       PROGRESS, DATA ANALYSIS, CONSULTS, AND MEDICAL DECISION MAKING  A complete history and physical exam have been performed.  All available laboratory and imaging results have been reviewed by myself prior to disposition.  Face mask and gloves were worn throughout the patient encounter, unless additional PPE was worn and specified below. Hand hygiene was performed before entering and after leaving the patient room.  [facility]    ED Course as of May 09 2302   Sat May 09, 2020   2044 Discussed pertinent information from history and physical exam with patient.  Discussed differential diagnosis.  Discussed plan for ED evaluation/work-up/treatment.  All questions answered.  Patient is agreeable with plan.        [JG]   2215 ESTHER query complete. Treatment plan to include limited course of prescribed  controlled substance. Risks including addiction, benefits, and alternatives presented to patient.       [JG]   2235 Phone call with radiologist.  I had previously reviewed the images. I discussed the patient, imaging, and their interpretation.  Findings significant for 3mm left ureteral stone. See dictated report for final interpretation.        [JG]   2251 The patient was reexamined.  They have had symptomatic improvement during their ED stay.  I discussed today's findings with the patient, explaining the pertinent positives and negatives from today's visit, and the plan of care.  Discussed plan for discharge as there is no emergent indication for admission.  Discussed limitation of the ED work-up and that this is to rule out life-threatening emergencies but that they could require further testing as determined by their primary care and or any referred specialist patient is agreeable and understands need for follow-up and repeat exam/testing.  Patient is aware that discharge does not mean there is  nothing wrong, indicates no emergency is present, and that they must continue their care with their primary care physician and/or any referred specialist.  They were given appropriate follow-up with their primary care physician and/or specialist.  I had an extensive discussion on the expected clinical course and return precautions.  Patient understands to return to the emergency department for continuation, worsening, or new symptoms.  I answered any of the patient's questions. Patient was discharged home in a stable condition.        [JG]      ED Course User Index  [JG] Tal Rubin MD       AS OF 23:02 VITALS:    BP - 138/84  HR - 92  TEMP - 98.8 °F (37.1 °C) (Tympanic)  O2 SATS - 95%    DIAGNOSIS  Final diagnoses:   Left ureteral stone   Kidney stone on left side   Renal cyst, right         DISPOSITION  DISCHARGE    Patient discharged in stable condition.    Reviewed implications of results, diagnosis, meds, responsibility to follow up, warning signs and symptoms of possible worsening, potential complications and reasons to return to ER.    Patient/Family voiced understanding of above instructions.    Discussed plan for discharge, as there is no emergent indication for admission. Patient referred to primary care provider for BP management due to today's BP. Pt/family is agreeable and understands need for follow up and repeat testing.  Pt is aware that discharge does not mean that nothing is wrong but it indicates no emergency is present that requires admission and they must continue care with follow-up as given below or physician of their choice.     FOLLOW-UP  Tom Garza MD  [address] [address]  [phone]    Schedule an appointment as soon as possible for a visit in 2 days  even if well    [facility]  [address] [facility] [address]  [phone]  Schedule an appointment as soon as possible for a visit in 2 days  for further management of your kidney  stone         Medication List      New Prescriptions    HYDROcodone-acetaminophen 5-325 MG per tablet  Commonly known as:  NORCO  Take 1 tablet by mouth Every 6 (Six) Hours As Needed for Moderate Pain .     ibuprofen 600 MG tablet  Commonly known as:  ADVIL,MOTRIN  Take 1 tablet by mouth Every 6 (Six) Hours As Needed for Mild Pain .     ondansetron ODT 8 MG disintegrating tablet  Commonly known as:  ZOFRAN-ODT  Place 1 tablet on the tongue Every 8 (Eight) Hours As Needed for Nausea or   Vomiting.     tamsulosin 0.4 MG capsule 24 hr capsule  Commonly known as:  FLOMAX  Take 1 capsule by mouth Daily for 14 days.           Tal Rubin MD  05/09/20 6721

## 2020-05-10 NOTE — ED TRIAGE NOTES
Pt ambulatory to ED c/o L flank pain that has increased in pain over the last few days. Pt rates pain 8/10 at this time. Pt NAD and masked at triage.

## 2020-05-10 NOTE — ED NOTES
.Keaton is wearing a surgical mask and I am wearing a surgical mask and protective glasses     Shreyas Garcia RN  05/09/20 2030

## 2020-07-09 ENCOUNTER — TELEPHONE (OUTPATIENT)
Dept: INTERNAL MEDICINE | Facility: CLINIC | Age: 60
End: 2020-07-09

## 2020-07-09 NOTE — TELEPHONE ENCOUNTER
Caller: Keaton Holloway    Relationship: Self    Best call back number: [phone] (M)    What medication are you requesting: PREDNISONE    What are your current symptoms:  RASH ITCHING    How long have you been experiencing symptoms: STARTED 07/04/20  Have you had these symptoms before:    [x] Yes  [] No    Have you been treated for these symptoms before:   [x] Yes  [] No    If a prescription is needed, what is your preferred pharmacy and phone number:      Additional notes:PATIENT IN [place] WITH WIFE AT THIS TIME AND WIFE HAS POISON IVY.      [id number] - [place], [place] - [address] AT [facility] [phone] [facility] [phone] FX

## 2020-07-09 NOTE — TELEPHONE ENCOUNTER
Please place pt on video visit on Friday, work in at lunch. Need to see rash and extent of rash to determine optimum tx.

## 2020-07-10 ENCOUNTER — TELEMEDICINE (OUTPATIENT)
Dept: INTERNAL MEDICINE | Facility: CLINIC | Age: 60
End: 2020-07-10

## 2020-07-10 DIAGNOSIS — L25.5 RHUS DERMATITIS: Primary | ICD-10-CM

## 2020-07-10 PROCEDURE — 99443 PR PHYS/QHP TELEPHONE EVALUATION 21-30 MIN: CPT | Performed by: INTERNAL MEDICINE

## 2020-07-10 RX ORDER — PREDNISONE 10 MG/1
TABLET ORAL
Qty: 65 TABLET | Refills: 0 | OUTPATIENT
Start: 2020-07-10 | End: 2021-04-07

## 2020-07-10 NOTE — PROGRESS NOTES
"Rash (wife has poison lm)      HPI  Keaton Holloway is a 60 y.o. male presents in acute care via video link in Epic. This visit is occurring during the COVID 19 pandemic.    You have chosen to receive care through a telehealth visit.  Do you consent to use a video/audio connection for your medical care today? Yes    Pt has been on golf trip in [place] for last week.  Notes wife got poison ivy and noted he got a few spots on Sunday AM prior to leaving on trip.  Notes in past has had to use steroids for poison ivy. Has sposs on B arm and B legs now.  \"Itching like crazy\". Rash did have bumps and vessicles.   Meds: using Calamine lotion; did start Zyrtec for trip and being outside.  Feels well otherwise.    Sent some pictures on Epic for me to see.    Notes has hx of high sensitivity of poison ivy.     Review of Systems   Constitution: Negative for chills and fever.   Skin: Positive for itching and rash. Negative for poor wound healing.   Musculoskeletal: Negative for myalgias.   Neurological: Negative for numbness and sensory change.       The following portions of the patient's history were reviewed and updated as appropriate: allergies, current medications, past medical history, past social history and problem list.      Current Outpatient Medications:   •  ALPRAZolam (XANAX) 0.5 MG tablet, Take 0.5 mg by mouth 2 (Two) Times a Day As Needed for Anxiety (take 1/2 to 1 tablet by mouth daily as needed for insomnia or anxiety)., Disp: , Rfl:   •  cetirizine (zyrTEC) 10 MG tablet, Take 10 mg by mouth Daily As Needed., Disp: , Rfl:   •  escitalopram (LEXAPRO) 10 MG tablet, Take 1 tablet by mouth Daily., Disp: 90 tablet, Rfl: 3  •  fluticasone (VERAMYST) 27.5 MCG/SPRAY nasal spray, 2 sprays into the nostril(s) as directed by provider Daily., Disp: , Rfl:   •  losartan (COZAAR) 25 MG tablet, TAKE 1 TABLET BY MOUTH EVERY DAY, Disp: 90 tablet, Rfl: 2  •  ondansetron ODT (ZOFRAN-ODT) 8 MG disintegrating tablet, Place 1 tablet on " the tongue Every 8 (Eight) Hours As Needed for Nausea or Vomiting., Disp: 15 tablet, Rfl: 0  •  predniSONE (DELTASONE) 10 MG tablet, Take 6 tabs PO daily for 3 days and then decrease by 10mg q 3 days. Complete taper with 1/2 tab PO daily for 3 days., Disp: 65 tablet, Rfl: 0  •  Saw Palmetto 1000 MG capsule, Take  by mouth., Disp: , Rfl:     There were no vitals filed for this visit.  There is no height or weight on file to calculate BMI.      Physical Exam   Constitutional: He is oriented to person, place, and time. He appears well-developed and well-nourished. No distress.   HENT:   Head: Normocephalic.   Pulmonary/Chest: Effort normal. No respiratory distress.   Neurological: He is alert and oriented to person, place, and time.   Psychiatric: He has a normal mood and affect. His behavior is normal. Thought content normal.   Vitals reviewed.      Assessment/ Plan  Diagnoses and all orders for this visit:    Rhus dermatitis  -     predniSONE (DELTASONE) 10 MG tablet; Take 6 tabs PO daily for 3 days and then decrease by 10mg q 3 days. Complete taper with 1/2 tab PO daily for 3 days.        Return for Next scheduled follow up.      Discussion:  Keaton Holloway is a 60 y.o. male presents in acute care (new issue to examiner) via video link in Epic. This visit is occurring during the COVID 19 pandemic.    Pt has hx of sensitivity to poison ivy and did start with typical rash and itch prior to leaving town for golf trip 5 days ago.  WIfe has current case of poison ivy as well. Pictures reviewed on my chart that pt sent today, vessicles and erythema noted; pt states on arms and legs B.  Desires oral steroids as feels like too widespread and hx of non-response to topicals.  Will send in 21 day oral prednisone taper. OK for PRN Calamine for itching topically.  D/W pt side effects of steroids. Call if not better.     Total time of visit ~15 minutes, and review of emailed pictures.

## 2020-07-10 NOTE — TELEPHONE ENCOUNTER
Pt could not do a video visit today. All he wanted was prednisone and I informed him that dr billy needs to see th rash before calling anything in.

## 2020-12-02 ENCOUNTER — TELEMEDICINE (OUTPATIENT)
Dept: INTERNAL MEDICINE | Facility: CLINIC | Age: 60
End: 2020-12-02

## 2020-12-02 DIAGNOSIS — J31.0 RHINOSINUSITIS: ICD-10-CM

## 2020-12-02 DIAGNOSIS — L25.5 DERMATITIS DUE TO PLANTS, INCLUDING POISON IVY, SUMAC, AND OAK: Primary | ICD-10-CM

## 2020-12-02 DIAGNOSIS — J32.9 RHINOSINUSITIS: ICD-10-CM

## 2020-12-02 PROCEDURE — 99213 OFFICE O/P EST LOW 20 MIN: CPT | Performed by: NURSE PRACTITIONER

## 2020-12-02 RX ORDER — TRIAMCINOLONE ACETONIDE 1 MG/G
CREAM TOPICAL 2 TIMES DAILY
Qty: 45 G | Refills: 3 | Status: SHIPPED | OUTPATIENT
Start: 2020-12-02 | End: 2022-08-17 | Stop reason: SDUPTHER

## 2020-12-02 RX ORDER — CEFUROXIME AXETIL 250 MG/1
250 TABLET ORAL 2 TIMES DAILY
Qty: 20 TABLET | Refills: 0 | OUTPATIENT
Start: 2020-12-02 | End: 2021-04-07

## 2020-12-02 NOTE — PROGRESS NOTES
Subjective   Keaton Holloway is a 60 y.o. male.   Chief Complaint   Patient presents with   • Sinus Problem   • Poison Ivy     There were no vitals filed for this visit.  No LMP for male patient.    Kt is a 60 year old male patient of Dr Garza who is being seen via telemedicine for an acute visit.   He c/o sinus congestion and yellow drainage for 2 months. He also has a rash on his left arm after being exposed to poison ivy.     Sinus Problem  This is a new problem. Episode onset: 2 months  The problem is unchanged. There has been no fever. He is experiencing no pain. Associated symptoms include congestion and sinus pressure. Pertinent negatives include no coughing, ear pain, shortness of breath, sore throat or swollen glands. (Yellow nasal drainage ) Treatments tried: allergy shots, veramyst    Poison Ivy  This is a new problem. The current episode started in the past 7 days. The affected locations include the left arm. The rash is characterized by blistering, itchiness and redness. He was exposed to plant contact. Associated symptoms include congestion. Pertinent negatives include no cough, fever, shortness of breath or sore throat. Past treatments include topical steroids. The treatment provided moderate relief.         The following portions of the patient's history were reviewed and updated as appropriate: allergies, current medications, past family history, past medical history, past social history, past surgical history and problem list.    Review of Systems   Constitutional: Negative for fever.   HENT: Positive for congestion and sinus pressure. Negative for ear pain and sore throat.    Respiratory: Negative for cough and shortness of breath.        Objective   Physical Exam  Constitutional:       General: He is awake.      Appearance: Normal appearance. He is well-developed and well-groomed.   HENT:      Head: Normocephalic.   Pulmonary:      Effort: Pulmonary effort is normal.   Skin:     Findings: Rash  present. Rash is macular and papular.      Comments: Erythematous rash in a linear pattern on the left arm   Pt states there is no drainage or crusting,      Neurological:      Mental Status: He is alert.   Psychiatric:         Behavior: Behavior is cooperative.         Assessment/Plan    Diagnoses and all orders for this visit:    1. Dermatitis due to plants, including poison ivy, sumac, and oak (Primary)    2. Rhinosinusitis    Other orders  -     triamcinolone (KENALOG) 0.1 % cream; Apply  topically to the appropriate area as directed 2 (Two) Times a Day.  Dispense: 45 g; Refill: 3  -     cefuroxime (Ceftin) 250 MG tablet; Take 1 tablet by mouth 2 (Two) Times a Day.  Dispense: 20 tablet; Refill: 0      Use triamcinolone as needed,   Keep the area clean and dry  Avoid scratching  Cool compresses as needed  Calamine as needed  Start cefuroxime  mucinex as needed  Saline nasal spray as needed  Follow up if symptoms persist, worsen or if new symptoms develop   telehealth duration and discussion with patient 15  minutes

## 2020-12-17 DIAGNOSIS — F41.1 GENERALIZED ANXIETY DISORDER: ICD-10-CM

## 2020-12-17 RX ORDER — ESCITALOPRAM OXALATE 10 MG/1
TABLET ORAL
Qty: 90 TABLET | Refills: 3 | Status: SHIPPED | OUTPATIENT
Start: 2020-12-17 | End: 2021-07-12 | Stop reason: SDUPTHER

## 2021-01-18 RX ORDER — LOSARTAN POTASSIUM 25 MG/1
TABLET ORAL
Qty: 90 TABLET | Refills: 2 | Status: SHIPPED | OUTPATIENT
Start: 2021-01-18 | End: 2021-10-21

## 2021-03-19 ENCOUNTER — BULK ORDERING (OUTPATIENT)
Dept: CASE MANAGEMENT | Facility: OTHER | Age: 61
End: 2021-03-19

## 2021-03-19 DIAGNOSIS — Z23 IMMUNIZATION DUE: ICD-10-CM

## 2021-04-07 ENCOUNTER — APPOINTMENT (OUTPATIENT)
Dept: GENERAL RADIOLOGY | Facility: HOSPITAL | Age: 61
End: 2021-04-07

## 2021-04-07 ENCOUNTER — HOSPITAL ENCOUNTER (EMERGENCY)
Facility: HOSPITAL | Age: 61
Discharge: HOME | End: 2021-04-07
Attending: EMERGENCY MEDICINE | Admitting: EMERGENCY MEDICINE

## 2021-04-07 VITALS
SYSTOLIC BLOOD PRESSURE: 129 MMHG | OXYGEN SATURATION: 97 % | TEMPERATURE: 98.7 F | RESPIRATION RATE: 18 BRPM | HEART RATE: 68 BPM | DIASTOLIC BLOOD PRESSURE: 94 MMHG

## 2021-04-07 DIAGNOSIS — M70.21 OLECRANON BURSITIS OF RIGHT ELBOW: Primary | ICD-10-CM

## 2021-04-07 PROCEDURE — 99283 EMERGENCY DEPT VISIT LOW MDM: CPT

## 2021-04-07 PROCEDURE — 73070 X-RAY EXAM OF ELBOW: CPT

## 2021-04-07 RX ORDER — NAPROXEN 500 MG/1
500 TABLET ORAL 2 TIMES DAILY
Qty: 60 TABLET | Refills: 0 | Status: SHIPPED | OUTPATIENT
Start: 2021-04-07 | End: 2021-04-07

## 2021-04-07 RX ORDER — NAPROXEN 500 MG/1
500 TABLET ORAL 2 TIMES DAILY
Qty: 30 TABLET | Refills: 0 | Status: SHIPPED | OUTPATIENT
Start: 2021-04-07 | End: 2021-07-12

## 2021-04-07 NOTE — DISCHARGE INSTRUCTIONS
Wear Ace wrap as needed for the next several days.  Take medication as prescribed.  Apply ice to affected area as needed.  Return to the emergency department for worsening pain, increased swelling, fever, chills, increased redness, red streaks going up your arm, or other concern.  Follow-up with Dr. Plaza of orthopedics if symptoms persist.

## 2021-04-07 NOTE — ED PROVIDER NOTES
EMERGENCY DEPARTMENT ENCOUNTER    Room Number:  40/40  Date of encounter:  4/7/2021  PCP: Tom Garza MD  Historian: Patient     I used full protective equipment while examining this patient.  This includes face mask, gloves and protective eyewear.  I washed my hands before entering the room and immediately upon leaving the room.  Patient was wearing a surgical mask.      HPI:  Chief Complaint: Right elbow pain  A complete HPI/ROS/PMH/PSH/SH/FH are unobtainable due to: None    Context: Keaton Holloway is a 61 y.o. male who presents to the ED c/o moderate right elbow pain that began early this morning.  Pain is constant but is worse with movement.  He took ibuprofen without relief.  He denies recent injury or insect bite.  However, he is on his computer a lot at work.  He is right-handed.  Denies numbness/tingling in his fingers, fever, chills, chest pain, or shortness of breath.      PAST MEDICAL HISTORY  Active Ambulatory Problems     Diagnosis Date Noted   • Generalized anxiety disorder 02/23/2016   • Environmental and seasonal allergies 08/08/2019   • Hx of acute prostatitis 08/08/2019   • Diverticulosis of large intestine without hemorrhage 08/08/2019   • History of colon polyps 08/08/2019   • Essential hypertension 08/19/2019     Resolved Ambulatory Problems     Diagnosis Date Noted   • Blood pressure elevated without history of HTN 08/08/2019   • Diverticulosis of large intestine without hemorrhage 08/08/2019     Past Medical History:   Diagnosis Date   • Environmental allergies 8/8/2019   • Plantar warts    • Prostatitis, acute    • Shingles          PAST SURGICAL HISTORY  Past Surgical History:   Procedure Laterality Date   • COLONOSCOPY  11/30/2012    Within normal limits. 3mm hyperplastic polyp; Dr. Miller. TV adenoma in 03/2010   • COLONOSCOPY N/A 6/30/2017    Procedure: COLONOSCOPY into cecum and TI;  Surgeon: Van Miller MD;  Location: [facility] ENDOSCOPY;  Service:    • ENDOSCOPY  03/2010    Mild  Gastritis; Dr. Miller   • MOLE REMOVAL      benign         FAMILY HISTORY  Family History   Problem Relation Age of Onset   • Heart attack Mother    • Osteoarthritis Mother    • Osteoarthritis Father    • Hypertension Father    • Hypertension Sister    • Lung cancer Paternal Grandmother    • Cerebral aneurysm Paternal Grandfather         hemorrhaged         SOCIAL HISTORY  Social History     Socioeconomic History   • Marital status:      Spouse name: Not on file   • Number of children: Not on file   • Years of education: Not on file   • Highest education level: Not on file   Tobacco Use   • Smoking status: Never Smoker   • Smokeless tobacco: Never Used   Substance and Sexual Activity   • Alcohol use: Yes     Alcohol/week: 2.0 standard drinks     Types: 2 Standard drinks or equivalent per week     Comment: 1-2 drinks/ week   • Drug use: No   • Sexual activity: Yes     Partners: Female     Comment: wife only; no hx of STD's         ALLERGIES  Patient has no known allergies.       REVIEW OF SYSTEMS  Review of Systems      All systems have been reviewed and are negative except as as discussed in the HPI    PHYSICAL EXAM    I have reviewed the triage vital signs and nursing notes.    ED Triage Vitals   Temp Heart Rate Resp BP SpO2   04/07/21 0830 04/07/21 0830 04/07/21 0831 04/07/21 0931 04/07/21 0830   98.7 °F (37.1 °C) 92 18 127/84 100 %      Temp src Heart Rate Source Patient Position BP Location FiO2 (%)   04/07/21 0830 -- 04/07/21 0931 -- --   Tympanic  Sitting         Physical Exam  GENERAL: Awake, alert, well-appearing  HENT: NCAT, nares patent  NECK: supple  CV: regular rhythm, regular rate  RESPIRATORY: normal effort, clear to auscultation bilaterally  ABDOMEN: soft, nontender  MUSCULOSKELETAL: There is mild tenderness and minimal swelling of the posterior aspect of the right elbow.  There is normal range of motion in all extremities.    NEURO: Strength, sensation, and coordination are grossly intact.   Speech and mentation are unremarkable.    SKIN: warm, dry; there is approximately 1 x 2 cm area of faint erythema over the posterior elbow, no lymphangitis, no drainage, no skin wound        LAB RESULTS  No results found for this or any previous visit (from the past 24 hour(s)).    Ordered the above labs and independently reviewed the results.      RADIOLOGY  XR Elbow 2 View Right    Result Date: 4/7/2021  RIGHT ELBOW X-RAY  HISTORY: Right elbow pain. No trauma.  TECHNIQUE: Two x-rays of the right elbow are provided.  FINDINGS: There is soft tissue edema over the dorsum of the elbow. No joint effusion is present. The bones around the elbow are intact. Joint spaces are normal.      Soft tissue swelling over the dorsum of the right elbow. Most commonly this would represent olecranon bursitis.  This report was finalized on 4/7/2021 11:03 AM by Dr. Tom Lr M.D.        I ordered the above noted radiological studies. Reviewed by me and discussed with radiologist.  See dictation for official radiology interpretation.      PROCEDURES  Procedures      MEDICATIONS GIVEN IN ER    Medications - No data to display      PROGRESS, DATA ANALYSIS, CONSULTS, AND MEDICAL DECISION MAKING    All labs have been independently reviewed by me.  All radiology studies have been reviewed by me and discussed with radiologist dictating the report.   EKG's independently viewed and interpreted by me.  I have reviewed the nurse's notes, vital signs, past medical history, and medication list.  Discussion below represents my analysis of pertinent findings related to patient's condition, differential diagnosis, treatment plan and final disposition.      ED Course as of Apr 07 1533   Wed Apr 07, 2021   1130 X-ray results discussed with the patient.  I suspect that the patient has olecranon bursitis.  There is not enough fluid to aspirate at this time.  He will be placed in an Ace wrap and given a prescription for naproxen.  He will be referred  to Ortho for follow-up.  Return precautions were discussed.    [WH]      ED Course User Index  [WH] Kirby Macario MD       AS OF 15:33 EDT VITALS:    BP - 129/94  HR - 68  TEMP - 98.7 °F (37.1 °C) (Tympanic)  O2 SATS - 97%      DIAGNOSIS  Final diagnoses:   Olecranon bursitis of right elbow         DISPOSITION  Discharge    DISCHARGE    Patient discharged in stable condition.    Reviewed implications of results, diagnosis, meds, responsibility to follow up, warning signs and symptoms of possible worsening, potential complications and reasons to return to ER, including worsening pain, increased swelling, increased redness, fever, chills, or other concern..    Patient/Family voiced understanding of above instructions.    Discussed plan for discharge, as there is no emergent indication for admission. Patient referred to primary care provider for BP management due to today's BP. Pt/family is agreeable and understands need for follow up and repeat testing.  Pt is aware that discharge does not mean that nothing is wrong but it indicates no emergency is present that requires admission and they must continue care with follow-up as given below or physician of their choice.     FOLLOW-UP  Brendan Hedrick MD  [address] [id number] [phone]    Schedule an appointment as soon as possible for a visit   If symptoms persist         Medication List      New Prescriptions    naproxen 500 MG tablet  Commonly known as: NAPROSYN  Take 1 tablet by mouth 2 (Two) Times a Day.        Stop    cefuroxime 250 MG tablet  Commonly known as: Ceftin     predniSONE 10 MG tablet  Commonly known as: DELTASONE           Where to Get Your Medications      These medications were sent to [facility]/pharmacy #[id number] - [place], [place] - [address] - [phone]  - [phone]   [address], [facility] [address]    Phone: [phone]   · naproxen 500 MG tablet           Dictated utilizing Dragon dictation:  Much of  this encounter note is an electronic transcription/translation of spoken language to printed text. The electronic translation of spoken language may permit erroneous, or at times, nonsensical words or phrases to be inadvertently transcribed; Although I have reviewed the note for such errors, some may still exist.     Kirby Macario MD  04/07/21 1533

## 2021-07-08 DIAGNOSIS — Z00.00 HEALTH MAINTENANCE EXAMINATION: Primary | ICD-10-CM

## 2021-07-08 DIAGNOSIS — I10 ESSENTIAL HYPERTENSION: ICD-10-CM

## 2021-07-09 LAB
ALBUMIN SERPL-MCNC: 4.8 G/DL (ref 3.5–5.2)
ALBUMIN/GLOB SERPL: 2.7 G/DL
ALP SERPL-CCNC: 56 U/L (ref 39–117)
ALT SERPL-CCNC: 20 U/L (ref 1–41)
APPEARANCE UR: CLEAR
AST SERPL-CCNC: 20 U/L (ref 1–40)
BACTERIA #/AREA URNS HPF: NORMAL /HPF
BASOPHILS # BLD AUTO: 0.03 10*3/MM3 (ref 0–0.2)
BASOPHILS NFR BLD AUTO: 0.7 % (ref 0–1.5)
BILIRUB SERPL-MCNC: 0.3 MG/DL (ref 0–1.2)
BILIRUB UR QL STRIP: NEGATIVE
BUN SERPL-MCNC: 18 MG/DL (ref 8–23)
BUN/CREAT SERPL: 16.2 (ref 7–25)
CALCIUM SERPL-MCNC: 9.6 MG/DL (ref 8.6–10.5)
CASTS URNS QL MICRO: NORMAL /LPF
CHLORIDE SERPL-SCNC: 107 MMOL/L (ref 98–107)
CHOLEST SERPL-MCNC: 167 MG/DL (ref 0–200)
CO2 SERPL-SCNC: 25.2 MMOL/L (ref 22–29)
COLOR UR: YELLOW
CREAT SERPL-MCNC: 1.11 MG/DL (ref 0.76–1.27)
EOSINOPHIL # BLD AUTO: 0.12 10*3/MM3 (ref 0–0.4)
EOSINOPHIL NFR BLD AUTO: 2.7 % (ref 0.3–6.2)
EPI CELLS #/AREA URNS HPF: NORMAL /HPF (ref 0–10)
ERYTHROCYTE [DISTWIDTH] IN BLOOD BY AUTOMATED COUNT: 13 % (ref 12.3–15.4)
GLOBULIN SER CALC-MCNC: 1.8 GM/DL
GLUCOSE SERPL-MCNC: 92 MG/DL (ref 65–99)
GLUCOSE UR QL: NEGATIVE
HBA1C MFR BLD: 5.4 % (ref 4.8–5.6)
HCT VFR BLD AUTO: 45.1 % (ref 37.5–51)
HDLC SERPL-MCNC: 42 MG/DL (ref 40–60)
HGB BLD-MCNC: 15 G/DL (ref 13–17.7)
HGB UR QL STRIP: NEGATIVE
IMM GRANULOCYTES # BLD AUTO: 0.01 10*3/MM3 (ref 0–0.05)
IMM GRANULOCYTES NFR BLD AUTO: 0.2 % (ref 0–0.5)
KETONES UR QL STRIP: NEGATIVE
LDLC SERPL CALC-MCNC: 109 MG/DL (ref 0–100)
LEUKOCYTE ESTERASE UR QL STRIP: NEGATIVE
LYMPHOCYTES # BLD AUTO: 1.38 10*3/MM3 (ref 0.7–3.1)
LYMPHOCYTES NFR BLD AUTO: 31.6 % (ref 19.6–45.3)
MCH RBC QN AUTO: 29.1 PG (ref 26.6–33)
MCHC RBC AUTO-ENTMCNC: 33.3 G/DL (ref 31.5–35.7)
MCV RBC AUTO: 87.4 FL (ref 79–97)
MICRO URNS: NORMAL
MICRO URNS: NORMAL
MONOCYTES # BLD AUTO: 0.41 10*3/MM3 (ref 0.1–0.9)
MONOCYTES NFR BLD AUTO: 9.4 % (ref 5–12)
NEUTROPHILS # BLD AUTO: 2.42 10*3/MM3 (ref 1.7–7)
NEUTROPHILS NFR BLD AUTO: 55.4 % (ref 42.7–76)
NITRITE UR QL STRIP: NEGATIVE
NRBC BLD AUTO-RTO: 0 /100 WBC (ref 0–0.2)
PH UR STRIP: 6 [PH] (ref 5–7.5)
PLATELET # BLD AUTO: 221 10*3/MM3 (ref 140–450)
POTASSIUM SERPL-SCNC: 5.2 MMOL/L (ref 3.5–5.2)
PROT SERPL-MCNC: 6.6 G/DL (ref 6–8.5)
PROT UR QL STRIP: NEGATIVE
RBC # BLD AUTO: 5.16 10*6/MM3 (ref 4.14–5.8)
RBC #/AREA URNS HPF: NORMAL /HPF (ref 0–2)
SODIUM SERPL-SCNC: 138 MMOL/L (ref 136–145)
SP GR UR: 1.03 (ref 1–1.03)
TRIGL SERPL-MCNC: 86 MG/DL (ref 0–150)
URINALYSIS REFLEX: NORMAL
UROBILINOGEN UR STRIP-MCNC: 0.2 MG/DL (ref 0.2–1)
VLDLC SERPL CALC-MCNC: 16 MG/DL (ref 5–40)
WBC # BLD AUTO: 4.37 10*3/MM3 (ref 3.4–10.8)
WBC #/AREA URNS HPF: NORMAL /HPF (ref 0–5)

## 2021-07-12 ENCOUNTER — OFFICE VISIT (OUTPATIENT)
Dept: INTERNAL MEDICINE | Facility: CLINIC | Age: 61
End: 2021-07-12

## 2021-07-12 VITALS
OXYGEN SATURATION: 100 % | BODY MASS INDEX: 24.64 KG/M2 | HEIGHT: 67 IN | TEMPERATURE: 97.1 F | RESPIRATION RATE: 16 BRPM | SYSTOLIC BLOOD PRESSURE: 126 MMHG | DIASTOLIC BLOOD PRESSURE: 84 MMHG | HEART RATE: 68 BPM | WEIGHT: 157 LBS

## 2021-07-12 DIAGNOSIS — Z86.010 HISTORY OF COLON POLYPS: ICD-10-CM

## 2021-07-12 DIAGNOSIS — K40.90 NON-RECURRENT UNILATERAL INGUINAL HERNIA WITHOUT OBSTRUCTION OR GANGRENE: ICD-10-CM

## 2021-07-12 DIAGNOSIS — J30.89 ENVIRONMENTAL AND SEASONAL ALLERGIES: ICD-10-CM

## 2021-07-12 DIAGNOSIS — F41.1 GENERALIZED ANXIETY DISORDER: ICD-10-CM

## 2021-07-12 DIAGNOSIS — Z12.5 SPECIAL SCREENING FOR MALIGNANT NEOPLASM OF PROSTATE: ICD-10-CM

## 2021-07-12 DIAGNOSIS — K57.30 DIVERTICULOSIS OF LARGE INTESTINE WITHOUT HEMORRHAGE: ICD-10-CM

## 2021-07-12 DIAGNOSIS — K42.9 UMBILICAL HERNIA WITHOUT OBSTRUCTION AND WITHOUT GANGRENE: ICD-10-CM

## 2021-07-12 DIAGNOSIS — I10 ESSENTIAL HYPERTENSION: ICD-10-CM

## 2021-07-12 DIAGNOSIS — N20.0 NEPHROLITHIASIS: ICD-10-CM

## 2021-07-12 DIAGNOSIS — Z00.00 HEALTHCARE MAINTENANCE: Primary | ICD-10-CM

## 2021-07-12 PROCEDURE — 99396 PREV VISIT EST AGE 40-64: CPT | Performed by: INTERNAL MEDICINE

## 2021-07-12 RX ORDER — ESCITALOPRAM OXALATE 10 MG/1
5 TABLET ORAL DAILY
Qty: 45 TABLET | Refills: 1 | Status: SHIPPED | OUTPATIENT
Start: 2021-07-12 | End: 2021-10-15 | Stop reason: SDUPTHER

## 2021-07-12 NOTE — PROGRESS NOTES
"Annual Exam      HPI  Keaton Holloway is a 61 y.o. male RTC in yearly CPE, review of medical issues:  Retired on 5/31/21.  Is enjoying it and has stayed really busy.  Has gained some weight after COVID started and was in more.  Is really busy and not getting regular exercise routine.    Olecranon bursitis 4/2021 - notes woke in middle of night a few months ago with pain and swelling.  Seen in ED.   1. Hx of elevated blood pressure without dx of HTN --> essential hypertension -  wants to get pressure down. Has not been exercising.   Has pressure log at home. Not correlated cuff in office in past.   2. CHRIS vs. Adjustment D/O with anxiety - started about 5 years ago with job stress.   Feels like wants to come off med. \"I dont feel anymore stress\".  Wants to come off med, 'I dont see why I need it'.   3. Environmental Allergies - on immunotherapy for years, intolerant off.  Feels like 'pretty good'.  Is on Veramyst daily and will use PRN Zyrtec. Feels like nasal passages are open.   4. Hx of BCC - dx'd by derm, removed OPT ~2016. Sees Dr. Nieves annually, has appt upcoming.  NO new lesions.       Review of Systems   Constitutional: Positive for weight gain. Negative for chills, fever and malaise/fatigue.   HENT: Negative for congestion, hearing loss, odynophagia and sore throat.    Eyes: Negative for discharge, double vision, pain and redness.        Last optho appt 7/2020; wears glasses     Cardiovascular: Negative for chest pain, dyspnea on exertion, irregular heartbeat, leg swelling, near-syncope, palpitations and syncope.   Respiratory: Negative for cough and shortness of breath.    Endocrine: Negative for polydipsia, polyphagia and polyuria.   Hematologic/Lymphatic: Negative for bleeding problem. Does not bruise/bleed easily.   Skin: Negative for rash and suspicious lesions.        Sees derm annually, appt upcoming     Musculoskeletal: Negative for arthritis, joint pain, joint swelling, muscle cramps and muscle " weakness.   Gastrointestinal: Negative for constipation, diarrhea, dysphagia, heartburn, nausea and vomiting.   Neurological: Negative for dizziness, headaches and light-headedness.   Psychiatric/Behavioral: Negative for depression. The patient does not have insomnia and is not nervous/anxious.        Problem List:    Patient Active Problem List   Diagnosis   • Generalized anxiety disorder   • Environmental and seasonal allergies   • Hx of acute prostatitis   • Diverticulosis of large intestine without hemorrhage   • History of colon polyps   • Essential hypertension   • Umbilical hernia without obstruction and without gangrene   • Non-recurrent unilateral inguinal hernia   • Nephrolithiasis       Medical History:    Past Medical History:   Diagnosis Date   • Diverticulosis of large intestine without hemorrhage 8/8/2019    Noted on C-scope 6/2017   • Environmental allergies 8/8/2019    On immunotherapy since 1982. Peanut, celery, and apple in raw form cause itchiness in throat; no hx of anaphylaxis.    • Generalized anxiety disorder 2/23/2016   • Nephrolithiasis 7/12/2021    S/p retrieval in 2020; retained stone noted.    • Olecranon bursitis, right elbow 04/2021    seen in ED   • Plantar warts     in youth   • Prostatitis, acute     2013 and 2014   • Shingles     in college        Social History:    Social History     Socioeconomic History   • Marital status:      Spouse name: Not on file   • Number of children: Not on file   • Years of education: Not on file   • Highest education level: Not on file   Tobacco Use   • Smoking status: Never Smoker   • Smokeless tobacco: Never Used   Vaping Use   • Vaping Use: Never used   Substance and Sexual Activity   • Alcohol use: Yes     Alcohol/week: 2.0 standard drinks     Types: 2 Standard drinks or equivalent per week     Comment: 1-2 drinks/ week   • Drug use: No   • Sexual activity: Yes     Partners: Female     Comment: wife only; no hx of STD's       Family  History:   Family History   Problem Relation Age of Onset   • Heart attack Mother    • Osteoarthritis Mother    • Osteoarthritis Father    • Hypertension Father    • Hypertension Sister    • Lung cancer Paternal Grandmother    • Cerebral aneurysm Paternal Grandfather         hemorrhaged   • No Known Problems Son        Surgical History:   Past Surgical History:   Procedure Laterality Date   • COLONOSCOPY  11/30/2012    Within normal limits. 3mm hyperplastic polyp; Dr. Miller. TV adenoma in 03/2010   • COLONOSCOPY N/A 6/30/2017    Procedure: COLONOSCOPY into cecum and TI;  Surgeon: Van Miller MD;  Location: [facility] ENDOSCOPY;  Service:    • ENDOSCOPY  03/2010    Mild Gastritis; Dr. Miller   • MOLE REMOVAL      benign         Current Outpatient Medications:   •  cetirizine (zyrTEC) 10 MG tablet, Take 10 mg by mouth Daily As Needed., Disp: , Rfl:   •  escitalopram (LEXAPRO) 10 MG tablet, Take 0.5 tablets by mouth Daily., Disp: 45 tablet, Rfl: 1  •  fluticasone (VERAMYST) 27.5 MCG/SPRAY nasal spray, 2 sprays into the nostril(s) as directed by provider Daily., Disp: , Rfl:   •  losartan (COZAAR) 25 MG tablet, TAKE 1 TABLET BY MOUTH EVERY DAY, Disp: 90 tablet, Rfl: 2  •  Saw Palmetto 1000 MG capsule, Take  by mouth., Disp: , Rfl:   •  triamcinolone (KENALOG) 0.1 % cream, Apply  topically to the appropriate area as directed 2 (Two) Times a Day., Disp: 45 g, Rfl: 3    Vitals:    07/12/21 1007   BP: 126/84   Pulse: 68   Resp: 16   Temp: 97.1 °F (36.2 °C)   SpO2: 100%     Body mass index is 24.58 kg/m².    Physical Exam  Vitals reviewed.   Constitutional:       General: He is not in acute distress.     Appearance: Normal appearance. He is well-developed. He is not ill-appearing or toxic-appearing.   HENT:      Head: Normocephalic and atraumatic.      Right Ear: Hearing, tympanic membrane, ear canal and external ear normal. There is no impacted cerumen.      Left Ear: Hearing, tympanic membrane, ear canal and external ear  normal. There is no impacted cerumen.      Nose: Nose normal.      Mouth/Throat:      Mouth: Mucous membranes are moist. No oral lesions.      Tongue: No lesions.      Pharynx: Oropharynx is clear. Uvula midline. No pharyngeal swelling, oropharyngeal exudate, posterior oropharyngeal erythema or uvula swelling.   Eyes:      General: Lids are normal. No scleral icterus.        Right eye: No discharge.         Left eye: No discharge.      Extraocular Movements: Extraocular movements intact.      Conjunctiva/sclera: Conjunctivae normal.      Pupils: Pupils are equal, round, and reactive to light.   Neck:      Thyroid: No thyroid mass or thyromegaly.      Vascular: No carotid bruit.   Cardiovascular:      Rate and Rhythm: Normal rate and regular rhythm.      Pulses:           Radial pulses are 2+ on the right side and 2+ on the left side.        Dorsalis pedis pulses are 2+ on the right side and 2+ on the left side.        Posterior tibial pulses are 2+ on the right side and 2+ on the left side.      Heart sounds: Normal heart sounds, S1 normal and S2 normal. No murmur heard.   No friction rub. No gallop.    Pulmonary:      Effort: Pulmonary effort is normal. No respiratory distress.      Breath sounds: Normal breath sounds. No wheezing, rhonchi or rales.   Abdominal:      General: Bowel sounds are normal. There is no distension.      Palpations: Abdomen is soft. There is no mass.      Tenderness: There is no abdominal tenderness. There is no guarding or rebound.      Hernia: A hernia is present. Hernia is present in the umbilical area (small, fully reducible).   Genitourinary:     Prostate: Normal. Not enlarged and not tender.      Rectum: External hemorrhoid (small, single, non-thrombosed) present. Normal anal tone.   Musculoskeletal:         General: No deformity. Normal range of motion.      Cervical back: Full passive range of motion without pain, normal range of motion and neck supple.      Right lower leg: No  edema.      Left lower leg: No edema.   Lymphadenopathy:      Cervical: No cervical adenopathy.      Right cervical: No superficial, deep or posterior cervical adenopathy.     Left cervical: No superficial, deep or posterior cervical adenopathy.      Upper Body:      Right upper body: No supraclavicular, axillary or pectoral adenopathy.      Left upper body: No supraclavicular, axillary or pectoral adenopathy.   Skin:     General: Skin is warm and dry.      Findings: No rash.   Neurological:      Mental Status: He is alert and oriented to person, place, and time.      Cranial Nerves: No cranial nerve deficit.      Sensory: No sensory deficit.      Motor: No weakness, tremor, atrophy or abnormal muscle tone.      Gait: Gait normal.      Deep Tendon Reflexes: Reflexes are normal and symmetric.      Reflex Scores:       Patellar reflexes are 2+ on the right side and 2+ on the left side.       Achilles reflexes are 2+ on the right side and 2+ on the left side.  Psychiatric:         Attention and Perception: Attention normal.         Mood and Affect: Mood normal.         Speech: Speech normal.         Behavior: Behavior normal. Behavior is cooperative.         Thought Content: Thought content normal.         Assessment/ Plan  Diagnoses and all orders for this visit:    Healthcare maintenance  -     PSA Screen    Special screening for malignant neoplasm of prostate  -     PSA Screen    Generalized anxiety disorder  -     escitalopram (LEXAPRO) 10 MG tablet; Take 0.5 tablets by mouth Daily.    Environmental and seasonal allergies    Diverticulosis of large intestine without hemorrhage    History of colon polyps    Essential hypertension    Non-recurrent unilateral inguinal hernia without obstruction or gangrene    Umbilical hernia without obstruction and without gangrene    Nephrolithiasis        Return in about 3 months (around 10/12/2021) for Recheck.      Discussion:  Keaton Delongjuan m is a 61 y.o. male RTC in yearly CPE,  review of medical issues:  Retired on 5/31/21, doing well and enjoying it.   1. Olecranon bursitis 4/2021 - sudden onset.  Seen in ED.  S/P Abx from ortho, resolved. CARIDAD.  2. Hx of elevated blood pressure without dx of HTN --> essential hypertension - good home log, correlates to recheck in office today after rest.  Will trend numbers with exercise addition as pt desires off B/P meds.  3. Dyslipidemia - recall, few true CV risks and 10 yr CR calculation prone to overestimate based on male sex and age.  I do not think pt is statin candidate, but will consider checking hsCRP next labs to further assess if cannot bring HDL up with CV exercise addition. Trend HDL and LDL next labs as pt is working on adding more exercise and diet mods.    4. Hx of Prostatitis infection x 2 since 2013 - CARIDAD.   5. Nephrolithiasis - recurrent in 2020 s/p retrieval; persists with one retained stone. CARIDAD and U/A clear, but monitor for sx per Dr. Mancilla in urology.   6. CHRIS vs. Adjustment D/O with anxiety - started about 5 years ago with job stress. Retired now and feeling well, desires wean on meds.  Change to 5mg Lexapro daily for 3 months and reassess.    7. Environmental Allergies - on immunotherapy for years, intolerant off. Veramyst daily with PRN Zyrtec.  8. Hx of BCC - dx'd by derm, removed OPT ~2016. Sees Dr. Nieves annually, appt upcoming.  Exam benign today.   9. Inguinal hernia (chronic) and new small, reducible umbilical hernia - watchful waiting. D/W pt s/s incarcerated hernia and to ED if occurs   10. HM - labs d/w pt; Flu Tdap/ Hep A/ Shingrix/ COVID - UTD; C-scope 6/2017 (-) --> 5 years (hx of polyps); GEOVANY OK, check PSA;  Hep C Ab (-) 8/2019; add more exercise.     RTC 3 months.

## 2021-07-13 LAB
Lab: NORMAL
PSA SERPL-MCNC: 1.66 NG/ML (ref 0–4)
WRITTEN AUTHORIZATION: NORMAL

## 2021-10-15 ENCOUNTER — OFFICE VISIT (OUTPATIENT)
Dept: INTERNAL MEDICINE | Facility: CLINIC | Age: 61
End: 2021-10-15

## 2021-10-15 VITALS
WEIGHT: 155 LBS | OXYGEN SATURATION: 98 % | TEMPERATURE: 97.1 F | SYSTOLIC BLOOD PRESSURE: 130 MMHG | HEIGHT: 67 IN | DIASTOLIC BLOOD PRESSURE: 82 MMHG | BODY MASS INDEX: 24.33 KG/M2 | HEART RATE: 75 BPM

## 2021-10-15 DIAGNOSIS — E78.5 DYSLIPIDEMIA: Primary | ICD-10-CM

## 2021-10-15 DIAGNOSIS — M77.12 LATERAL EPICONDYLITIS OF LEFT ELBOW: ICD-10-CM

## 2021-10-15 DIAGNOSIS — M70.21 OLECRANON BURSITIS, RIGHT ELBOW: ICD-10-CM

## 2021-10-15 DIAGNOSIS — F19.90 EXCESSIVE USE OF NONSTEROIDAL ANTI-INFLAMMATORY DRUGS (NSAIDS): ICD-10-CM

## 2021-10-15 DIAGNOSIS — F41.1 GENERALIZED ANXIETY DISORDER: ICD-10-CM

## 2021-10-15 DIAGNOSIS — I10 ESSENTIAL HYPERTENSION: ICD-10-CM

## 2021-10-15 DIAGNOSIS — Z23 NEED FOR INFLUENZA VACCINATION: ICD-10-CM

## 2021-10-15 PROCEDURE — 90686 IIV4 VACC NO PRSV 0.5 ML IM: CPT | Performed by: INTERNAL MEDICINE

## 2021-10-15 PROCEDURE — 90471 IMMUNIZATION ADMIN: CPT | Performed by: INTERNAL MEDICINE

## 2021-10-15 PROCEDURE — 99214 OFFICE O/P EST MOD 30 MIN: CPT | Performed by: INTERNAL MEDICINE

## 2021-10-15 RX ORDER — ESCITALOPRAM OXALATE 10 MG/1
10 TABLET ORAL DAILY
Qty: 90 TABLET | Refills: 1
Start: 2021-10-15 | End: 2022-01-03

## 2021-10-15 NOTE — PROGRESS NOTES
"Follow-up      HPI  Keaton Holloway is a 61 y.o. male RTC in f/u:   Has been doing well.  Busy working on property and playing golf 2x/ week.    1. Olecranon bursitis 4/2021 - sudden onset.  Seen in ED. Has that 'under control'.  However now 'has tennis elbow'.  Has started doing some stretches.  Pain I located focally over lateral elbow.  Brace 'sometimes'.  Hurts if hits area.   Still on Naproxen 440mg BID for bursa, 'it had been' hurting still. Is not hurting as bad. Has not tried Voltaren gel.   2. Hx of elevated blood pressure without dx of HTN --> essential hypertension - home log shows that is about 10 points higher this AM from # here. Did not bring cuff in.  Agrees will bring in next visit.   3. Dyslipidemia -  Not able to make as many changes as had hoped. Not as much time to exercise. Diet is 'good'. Plans to exercise more in winter when outdoor work slows down.     4. CHRIS vs. Adjustment D/O with anxiety - started about 5 years ago with job stress. Cut back to 1/2 dose of Lexapro. After 4-5 weeks went bal pill as 'not ready to get off of it quite yet\".  Getting really anxious as has numerous stressors with home and family. No doing the exercises as much as wanted to.   5. HM - needs flu shot today.     Review of Systems   Constitutional: Negative for chills, fever and malaise/fatigue.   Cardiovascular: Negative for chest pain and dyspnea on exertion.   Respiratory: Negative for shortness of breath.    Skin: Negative for rash.   Musculoskeletal: Positive for joint pain (L elbow, laterally; r elbow largely resolved. ). Negative for muscle weakness.   Neurological: Negative for focal weakness.   Psychiatric/Behavioral: Negative for depression. The patient is nervous/anxious (improved back on higher dose of SSRI).        The following portions of the patient's history were reviewed and updated as appropriate: allergies, current medications, past medical history, past social history and problem " "list.      Current Outpatient Medications:   •  cetirizine (zyrTEC) 10 MG tablet, Take 10 mg by mouth Daily As Needed., Disp: , Rfl:   •  escitalopram (LEXAPRO) 10 MG tablet, Take 1 tablet by mouth Daily., Disp: 90 tablet, Rfl: 1  •  fluticasone (VERAMYST) 27.5 MCG/SPRAY nasal spray, 2 sprays into the nostril(s) as directed by provider Daily., Disp: , Rfl:   •  losartan (COZAAR) 25 MG tablet, TAKE 1 TABLET BY MOUTH EVERY DAY, Disp: 90 tablet, Rfl: 2  •  Saw Palmetto 1000 MG capsule, Take  by mouth., Disp: , Rfl:   •  triamcinolone (KENALOG) 0.1 % cream, Apply  topically to the appropriate area as directed 2 (Two) Times a Day., Disp: 45 g, Rfl: 3  •  Diclofenac Sodium (VOLTAREN) 1 % gel gel, Apply 4 g topically to the appropriate area as directed 4 (Four) Times a Day., Disp: , Rfl:     Vitals:    10/15/21 0841   BP: 130/82   Pulse: 75   Temp: 97.1 °F (36.2 °C)   SpO2: 98%   Weight: 70.3 kg (155 lb)   Height: 170.2 cm (67\")     Body mass index is 24.28 kg/m².      Physical Exam  Vitals reviewed.   Constitutional:       General: He is not in acute distress.     Appearance: He is well-developed. He is not ill-appearing or toxic-appearing.   HENT:      Head: Normocephalic and atraumatic.      Mouth/Throat:      Mouth: No oral lesions.      Tongue: No lesions.      Pharynx: No pharyngeal swelling or uvula swelling.   Eyes:      General: No scleral icterus.     Pupils: Pupils are equal, round, and reactive to light.   Neck:      Vascular: No carotid bruit.   Cardiovascular:      Rate and Rhythm: Normal rate and regular rhythm.      Pulses:           Carotid pulses are 2+ on the right side and 2+ on the left side.       Radial pulses are 2+ on the right side and 2+ on the left side.      Heart sounds: Normal heart sounds.   Pulmonary:      Effort: Pulmonary effort is normal. No respiratory distress.      Breath sounds: Normal breath sounds. No wheezing, rhonchi or rales.   Musculoskeletal:      Right elbow: No swelling or " deformity. Normal range of motion. No tenderness.      Left elbow: No swelling or deformity. Normal range of motion. Tenderness (pain at lateral elbow with resisted wrist extension) present in lateral epicondyle. No medial epicondyle or olecranon process tenderness.      Right hand: No deformity. Normal strength.      Left hand: No deformity. Normal strength.      Cervical back: Normal range of motion and neck supple. No muscular tenderness.   Lymphadenopathy:      Cervical: No cervical adenopathy.   Neurological:      Mental Status: He is alert and oriented to person, place, and time.      Cranial Nerves: No cranial nerve deficit.      Gait: Gait normal.   Psychiatric:         Attention and Perception: Attention normal.         Mood and Affect: Mood and affect normal.         Behavior: Behavior normal.         Thought Content: Thought content normal.         Assessment/ Plan  Diagnoses and all orders for this visit:    Dyslipidemia  -     LDL Cholesterol, Direct  -     Cholesterol, Total  -     HDL Cholesterol  -     High Sensitivity CRP  -     Comprehensive Metabolic Panel    Essential hypertension  -     Comprehensive Metabolic Panel  -     TSH    Generalized anxiety disorder  -     escitalopram (LEXAPRO) 10 MG tablet; Take 1 tablet by mouth Daily.  -     TSH    Olecranon bursitis, right elbow  -     Diclofenac Sodium (VOLTAREN) 1 % gel gel; Apply 4 g topically to the appropriate area as directed 4 (Four) Times a Day.    Lateral epicondylitis of left elbow  -     Diclofenac Sodium (VOLTAREN) 1 % gel gel; Apply 4 g topically to the appropriate area as directed 4 (Four) Times a Day.    Need for influenza vaccination  -     FluLaval/Fluarix/Fluzone >6 Months ([id number])    Excessive use of nonsteroidal anti-inflammatory drugs (NSAIDs)        Return in about 6 months (around 4/15/2022) for Recheck.      Discussion:  Keaton Holloway is a 61 y.o. male RTC in f/u:     1. Olecranon bursitis 4/2021 - largely resolved on   Naproxen 440mg BID. Exam benign today, no swelling or TTP. D/C oral NSAIDs and OK for PRN Voltaren gel.   2. Essential hypertension - controlled on single drug. C/W home log (? overread by hx today). Check BMP today.   3. Dyslipidemia - not making as many CV exercise changes as needed with busy home life. Low HDL issue. Recall, few true CV risks and 10 yr CR calculation prone to overestimate based on male sex and age.  I do not think pt is statin candidate, but will check hsCRP with lipids (non-fasting) today to assess and will f/u after labs.   4. CHRIS vs. Adjustment D/O with anxiety - did not tolerate wean to 1/2 dose with noted home stressors. Back on 10mg, doing well.  Will continue to assess over time.  Check TSH today.   5. Lateal epicondylitis, L - new issue today, exam and hx are consistent. Eccentric exercises daily and counter force brace reviewed, handout provided.  OK for topical Voltaren gel PRN.   6. HM - flu shot today.     RTC 6 months, F labs prior    Answers for HPI/ROS submitted by the patient on 10/8/2021  What is the primary reason for your visit?: Other  Please describe your symptoms.: Follow up from physical  Have you had these symptoms before?: No  How long have you been having these symptoms?: 1-4 days  Please list any medications you are currently taking for this condition.: N/a  Please describe any probable cause for these symptoms. : N/a

## 2021-10-16 LAB
ALBUMIN SERPL-MCNC: 4.5 G/DL (ref 3.5–5.2)
ALBUMIN/GLOB SERPL: 2.5 G/DL
ALP SERPL-CCNC: 56 U/L (ref 39–117)
ALT SERPL-CCNC: 21 U/L (ref 1–41)
AST SERPL-CCNC: 20 U/L (ref 1–40)
BILIRUB SERPL-MCNC: 0.3 MG/DL (ref 0–1.2)
BUN SERPL-MCNC: 19 MG/DL (ref 8–23)
BUN/CREAT SERPL: 17.1 (ref 7–25)
CALCIUM SERPL-MCNC: 9.2 MG/DL (ref 8.6–10.5)
CHLORIDE SERPL-SCNC: 107 MMOL/L (ref 98–107)
CHOLEST SERPL-MCNC: 163 MG/DL (ref 0–200)
CO2 SERPL-SCNC: 25.6 MMOL/L (ref 22–29)
CREAT SERPL-MCNC: 1.11 MG/DL (ref 0.76–1.27)
CRP SERPL HS-MCNC: 0.64 MG/L (ref 0–3)
GLOBULIN SER CALC-MCNC: 1.8 GM/DL
GLUCOSE SERPL-MCNC: 90 MG/DL (ref 65–99)
HDLC SERPL-MCNC: 37 MG/DL (ref 40–60)
LDLC SERPL DIRECT ASSAY-MCNC: 94 MG/DL (ref 0–100)
POTASSIUM SERPL-SCNC: 4.4 MMOL/L (ref 3.5–5.2)
PROT SERPL-MCNC: 6.3 G/DL (ref 6–8.5)
SODIUM SERPL-SCNC: 143 MMOL/L (ref 136–145)
TSH SERPL DL<=0.005 MIU/L-ACNC: 0.93 UIU/ML (ref 0.27–4.2)

## 2021-10-21 RX ORDER — LOSARTAN POTASSIUM 25 MG/1
TABLET ORAL
Qty: 90 TABLET | Refills: 2 | Status: SHIPPED | OUTPATIENT
Start: 2021-10-21 | End: 2022-07-12 | Stop reason: SDUPTHER

## 2021-12-31 DIAGNOSIS — F41.1 GENERALIZED ANXIETY DISORDER: ICD-10-CM

## 2022-01-03 RX ORDER — ESCITALOPRAM OXALATE 10 MG/1
TABLET ORAL
Qty: 90 TABLET | Refills: 3 | Status: SHIPPED | OUTPATIENT
Start: 2022-01-03 | End: 2022-10-31 | Stop reason: SDUPTHER

## 2022-04-14 DIAGNOSIS — E78.5 DYSLIPIDEMIA: ICD-10-CM

## 2022-04-14 DIAGNOSIS — I10 ESSENTIAL HYPERTENSION: Primary | ICD-10-CM

## 2022-04-21 LAB
ALBUMIN SERPL-MCNC: 4.6 G/DL (ref 3.8–4.8)
ALBUMIN/GLOB SERPL: 2.2 {RATIO} (ref 1.2–2.2)
ALP SERPL-CCNC: 58 IU/L (ref 44–121)
ALT SERPL-CCNC: 22 IU/L (ref 0–44)
AST SERPL-CCNC: 28 IU/L (ref 0–40)
BILIRUB SERPL-MCNC: 0.5 MG/DL (ref 0–1.2)
BUN SERPL-MCNC: 19 MG/DL (ref 8–27)
BUN/CREAT SERPL: 16 (ref 10–24)
CALCIUM SERPL-MCNC: 9.6 MG/DL (ref 8.6–10.2)
CHLORIDE SERPL-SCNC: 108 MMOL/L (ref 96–106)
CO2 SERPL-SCNC: 23 MMOL/L (ref 20–29)
CREAT SERPL-MCNC: 1.2 MG/DL (ref 0.76–1.27)
EGFRCR SERPLBLD CKD-EPI 2021: 68 ML/MIN/1.73
GLOBULIN SER CALC-MCNC: 2.1 G/DL (ref 1.5–4.5)
GLUCOSE SERPL-MCNC: 92 MG/DL (ref 65–99)
HDLC SERPL-MCNC: 45 MG/DL
LDLC SERPL DIRECT ASSAY-MCNC: 128 MG/DL (ref 0–99)
POTASSIUM SERPL-SCNC: 5 MMOL/L (ref 3.5–5.2)
PROT SERPL-MCNC: 6.7 G/DL (ref 6–8.5)
SODIUM SERPL-SCNC: 146 MMOL/L (ref 134–144)

## 2022-04-22 ENCOUNTER — OFFICE VISIT (OUTPATIENT)
Dept: INTERNAL MEDICINE | Facility: CLINIC | Age: 62
End: 2022-04-22

## 2022-04-22 VITALS
HEART RATE: 68 BPM | HEIGHT: 67 IN | OXYGEN SATURATION: 98 % | DIASTOLIC BLOOD PRESSURE: 70 MMHG | TEMPERATURE: 97.1 F | WEIGHT: 156 LBS | BODY MASS INDEX: 24.48 KG/M2 | SYSTOLIC BLOOD PRESSURE: 122 MMHG

## 2022-04-22 DIAGNOSIS — F41.1 GENERALIZED ANXIETY DISORDER: Primary | ICD-10-CM

## 2022-04-22 DIAGNOSIS — Z00.00 HEALTHCARE MAINTENANCE: ICD-10-CM

## 2022-04-22 DIAGNOSIS — I10 ESSENTIAL HYPERTENSION: ICD-10-CM

## 2022-04-22 DIAGNOSIS — E78.5 DYSLIPIDEMIA: ICD-10-CM

## 2022-04-22 DIAGNOSIS — J30.89 ENVIRONMENTAL AND SEASONAL ALLERGIES: ICD-10-CM

## 2022-04-22 PROCEDURE — 99214 OFFICE O/P EST MOD 30 MIN: CPT | Performed by: INTERNAL MEDICINE

## 2022-04-22 NOTE — PROGRESS NOTES
"Hypertension      HPI  Keaton Holloway is a 62 y.o. male RTC in f/u:  Retired. Feels like doing well.   Allergies are 'little worse this year'. Had allergy shot this AM. Is on Zyrtec, 'I dont even know I have an allergy'. Is on Veramyst daily in Spring and all year.    Will have issues in Fall.      1. Essential hypertension -on single drug.  Has been tracking at home.  Numbers have been good overall.  Getting 1210's-120's/ 70-80's.  Feels like 'they look good'.   2. Dyslipidemia - not making as many CV exercise changes as needed with busy home life.  Is still active at new house/ land.  Is not in routine. Is eating mostly at home.   Plans workout area for garage. Diet is good overall.  Eats at home mostly.   3. CHRIS vs. Adjustment D/O with anxiety - did not tolerate wean to 1/2 dose with noted home stressors. Still back on 10mg. \"I want to go ahead and stay on that, mostly because of my mother in law'.  Has stressors caring for her in the home.   4. HM - is thinking about getting 4th COVID shot.     Review of Systems   Constitutional: Negative for chills, fever and weight loss.   HENT: Negative for congestion.    Cardiovascular: Negative for chest pain, dyspnea on exertion, irregular heartbeat and palpitations.   Respiratory: Negative for shortness of breath.    Neurological: Negative for dizziness, headaches and light-headedness.   Allergic/Immunologic: Positive for environmental allergies.       The following portions of the patient's history were reviewed and updated as appropriate: allergies, current medications, past medical history, past social history and problem list.      Current Outpatient Medications:   •  cetirizine (zyrTEC) 10 MG tablet, Take 10 mg by mouth Daily As Needed., Disp: , Rfl:   •  Diclofenac Sodium (VOLTAREN) 1 % gel gel, Apply 4 g topically to the appropriate area as directed 4 (Four) Times a Day., Disp: , Rfl:   •  escitalopram (LEXAPRO) 10 MG tablet, TAKE 1 TABLET BY MOUTH EVERY DAY, Disp: " "90 tablet, Rfl: 3  •  fluticasone (VERAMYST) 27.5 MCG/SPRAY nasal spray, 2 sprays into the nostril(s) as directed by provider Daily., Disp: , Rfl:   •  losartan (COZAAR) 25 MG tablet, TAKE 1 TABLET BY MOUTH EVERY DAY, Disp: 90 tablet, Rfl: 2  •  Saw Palmetto 1000 MG capsule, Take  by mouth., Disp: , Rfl:   •  triamcinolone (KENALOG) 0.1 % cream, Apply  topically to the appropriate area as directed 2 (Two) Times a Day., Disp: 45 g, Rfl: 3    Vitals:    04/22/22 1148   BP: 122/70   Pulse: 68   Temp: 97.1 °F (36.2 °C)   SpO2: 98%   Weight: 70.8 kg (156 lb)   Height: 170.2 cm (67\")     Body mass index is 24.43 kg/m².      Physical Exam  Vitals reviewed.   Constitutional:       General: He is not in acute distress.     Appearance: He is well-developed. He is not ill-appearing or toxic-appearing.   HENT:      Head: Normocephalic and atraumatic.      Right Ear: Tympanic membrane, ear canal and external ear normal.      Left Ear: Tympanic membrane, ear canal and external ear normal.      Ears:      Comments: Small cerumen on L, partially obscures TM     Mouth/Throat:      Mouth: No oral lesions.      Tongue: No lesions.      Pharynx: No pharyngeal swelling or uvula swelling.   Eyes:      General: No scleral icterus.     Conjunctiva/sclera: Conjunctivae normal.      Pupils: Pupils are equal, round, and reactive to light.   Neck:      Vascular: No carotid bruit.   Cardiovascular:      Rate and Rhythm: Normal rate and regular rhythm.      Pulses:           Carotid pulses are 2+ on the right side and 2+ on the left side.       Radial pulses are 2+ on the right side and 2+ on the left side.      Heart sounds: Normal heart sounds.   Pulmonary:      Effort: Pulmonary effort is normal. No respiratory distress.      Breath sounds: Normal breath sounds. No wheezing, rhonchi or rales.   Musculoskeletal:      Cervical back: Normal range of motion and neck supple. No muscular tenderness.      Right lower leg: No edema.      Left lower " leg: No edema.   Lymphadenopathy:      Cervical: No cervical adenopathy.   Neurological:      Mental Status: He is alert and oriented to person, place, and time.      Cranial Nerves: No cranial nerve deficit.      Gait: Gait normal.   Psychiatric:         Attention and Perception: Attention normal.         Mood and Affect: Mood and affect normal.         Behavior: Behavior normal.         Thought Content: Thought content normal.         Assessment/ Plan  Diagnoses and all orders for this visit:    Generalized anxiety disorder    Essential hypertension    Environmental and seasonal allergies    Healthcare maintenance    Dyslipidemia        Return in about 6 months (around 10/22/2022) for Annual physical.      Discussion:  Keaton Holloway is a 62 y.o. male RTC in f/u:  1. Essential hypertension - controlled on one drug. Good home log.  BMP OK.   2. Dyslipidemia - Low HDL issue in past, improved on recheck today. hsCRP low on 10/2021 labs.  Recall, few true CV risks and 10 yr CR calculation prone to overestimate based on male sex and age.  I do not think pt is statin candidate. CV exercise addition advised.   3. CHRIS vs. Adjustment D/O with anxiety - did not tolerate wean to 1/2 dose with noted home stressors, mood controlled on 10mg escitalopram daily.   4. HM - 4th COVID shot advised; small cerumen L ear, will use home lavage to clear.      Answers for HPI/ROS submitted by the patient on 4/16/2022  What is the primary reason for your visit?: Other  Please describe your symptoms.: Follow up visit  Have you had these symptoms before?: Yes  How long have you been having these symptoms?: Greater than 2 weeks  Please list any medications you are currently taking for this condition.: None  Please describe any probable cause for these symptoms. : N/a

## 2022-07-12 RX ORDER — LOSARTAN POTASSIUM 25 MG/1
25 TABLET ORAL DAILY
Qty: 90 TABLET | Refills: 2 | Status: SHIPPED | OUTPATIENT
Start: 2022-07-12 | End: 2022-07-25

## 2022-07-23 ENCOUNTER — TELEMEDICINE (OUTPATIENT)
Dept: FAMILY MEDICINE CLINIC | Facility: TELEHEALTH | Age: 62
End: 2022-07-23

## 2022-07-23 VITALS — TEMPERATURE: 102.5 F

## 2022-07-23 DIAGNOSIS — U07.1 COVID-19: Primary | ICD-10-CM

## 2022-07-23 PROCEDURE — 99213 OFFICE O/P EST LOW 20 MIN: CPT | Performed by: NURSE PRACTITIONER

## 2022-07-23 RX ORDER — PSEUDOEPHEDRINE HCL 30 MG
30 TABLET ORAL EVERY 4 HOURS PRN
COMMUNITY
End: 2022-10-31

## 2022-07-23 RX ORDER — ACETAMINOPHEN 160 MG/5ML
15 SOLUTION ORAL EVERY 4 HOURS PRN
COMMUNITY
End: 2022-10-31

## 2022-07-23 NOTE — PROGRESS NOTES
You have chosen to receive care through a telehealth visit.  Do you consent to use a video/audio connection for your medical care today? Yes     CHIEF COMPLAINT  Chief Complaint   Patient presents with   • Covid-19 Home Monitoring Video Visit         HPI  Keaton Holloway is a 62 y.o. male  presents with complaint of testing positive for Covid 19 on Friday. His symptoms began Thursday and include fever (102.5) cough, nasal congestion, body aches and fatigue. He is using Tylenol  and IBU with an OTC cold and cough medication. He desires to start the Paxlovid. He has been fully vaccinated and boostered.     Review of Systems   Constitutional: Positive for activity change, fatigue and fever.   HENT: Positive for congestion, postnasal drip and rhinorrhea.    Respiratory: Positive for cough. Negative for shortness of breath, wheezing and stridor.    Cardiovascular: Negative.    Gastrointestinal: Negative.    Musculoskeletal: Positive for myalgias.   Skin: Negative.    Neurological: Negative.    Hematological: Negative.    Psychiatric/Behavioral: Negative.        Past Medical History:   Diagnosis Date   • Diverticulosis of large intestine without hemorrhage 08/08/2019    Noted on C-scope 6/2017   • Environmental allergies 08/08/2019    On immunotherapy since 1982. Peanut, celery, and apple in raw form cause itchiness in throat; no hx of anaphylaxis.    • Generalized anxiety disorder 02/23/2016   • Hypertension    • Nephrolithiasis 07/12/2021    S/p retrieval in 2020; retained stone noted.    • Olecranon bursitis, right elbow 04/2021    seen in ED   • Plantar warts     in youth   • Prostatitis, acute     2013 and 2014   • Shingles     in college       Family History   Problem Relation Age of Onset   • Heart attack Mother    • Osteoarthritis Mother    • Osteoarthritis Father    • Hypertension Father    • Hypertension Sister    • Lung cancer Paternal Grandmother    • Cerebral aneurysm Paternal Grandfather         hemorrhaged    • No Known Problems Son        Social History     Socioeconomic History   • Marital status:    Tobacco Use   • Smoking status: Never Smoker   • Smokeless tobacco: Never Used   Vaping Use   • Vaping Use: Never used   Substance and Sexual Activity   • Alcohol use: Yes     Alcohol/week: 2.0 standard drinks     Types: 2 Standard drinks or equivalent per week     Comment: 1-2 drinks/ week   • Drug use: No   • Sexual activity: Yes     Partners: Female     Comment: wife only; no hx of STD's       Keaton Holloway  reports that he has never smoked. He has never used smokeless tobacco..     Temp (!) 102.5 °F (39.2 °C)     PHYSICAL EXAM  Physical Exam   Constitutional: He is oriented to person, place, and time. He appears well-developed and well-nourished. He does not have a sickly appearance. He does not appear ill. No distress.   HENT:   Head: Normocephalic and atraumatic.   Right Ear: External ear normal.   Left Ear: External ear normal.   Pulmonary/Chest: Effort normal.  No respiratory distress. He no audible wheeze...  Neurological: He is alert and oriented to person, place, and time.   Psychiatric: He has a normal mood and affect.   Vitals reviewed.      Results for orders placed or performed in visit on 04/14/22   Comprehensive Metabolic Panel    Specimen: Blood   Result Value Ref Range    Glucose 92 65 - 99 mg/dL    BUN 19 8 - 27 mg/dL    Creatinine 1.20 0.76 - 1.27 mg/dL    EGFR Result 68 >59 mL/min/1.73    BUN/Creatinine Ratio 16 10 - 24    Sodium 146 (H) 134 - 144 mmol/L    Potassium 5.0 3.5 - 5.2 mmol/L    Chloride 108 (H) 96 - 106 mmol/L    Total CO2 23 20 - 29 mmol/L    Calcium 9.6 8.6 - 10.2 mg/dL    Total Protein 6.7 6.0 - 8.5 g/dL    Albumin 4.6 3.8 - 4.8 g/dL    Globulin 2.1 1.5 - 4.5 g/dL    A/G Ratio 2.2 1.2 - 2.2    Total Bilirubin 0.5 0.0 - 1.2 mg/dL    Alkaline Phosphatase 58 44 - 121 IU/L    AST (SGOT) 28 0 - 40 IU/L    ALT (SGPT) 22 0 - 44 IU/L   HDL Cholesterol    Specimen: Blood   Result  Value Ref Range    HDL Cholesterol 45 >39 mg/dL   LDL Cholesterol, Direct    Specimen: Blood   Result Value Ref Range    LDL Cholesterol  128 (H) 0 - 99 mg/dL       Diagnoses and all orders for this visit:    1. COVID-19 (Primary)  -     Nirmatrelvir & Ritonavir (PAXLOVID) 20 x 150 MG & 10 x 100MG tablet therapy pack tablet; Take 3 tablets by mouth 2 (Two) Times a Day for 5 days.  Dispense: 30 tablet; Refill: 0  -     QUESTIONNAIRE SERIES    increase fluids and rest.   Follow up at ED for worsening s/s    Patient agreed to Paxlovid therapy.   Fact sheet emailed to patient.   Discussed possible s/e  Patient is aware of options for treatment and knows this drug is not FDA approved and is in agreement to be treated       FOLLOW-UP  As discussed during visit with PCP/[facility] if no improvement or Urgent Care/Emergency Department if worsening of symptoms    Patient verbalizes understanding of medication dosage, comfort measures, instructions for treatment and follow-up.    Andreina John, VANGIE  07/23/2022  15:24 EDT    The use of a video visit has been reviewed with the patient and verbal informed consent has been obtained. Myself and Keaton Holloway participated in this visit. The patient is located in [address] [place] [address].    I am located in [place], [place]. Mychart and Zoom were utilized. I spent  25  minutes in the patient's chart for this visit.

## 2022-07-23 NOTE — PATIENT INSTRUCTIONS
10 Things You Can Do to Manage Your COVID-19 Symptoms at Home  If you have possible or confirmed COVID-19:  Stay home except to get medical care.  Monitor your symptoms carefully. If your symptoms get worse, call your healthcare provider immediately.  Get rest and stay hydrated.  If you have a medical appointment, call the healthcare provider ahead of time and tell them that you have or may have COVID-19.  For medical emergencies, call 911 and notify the dispatch personnel that you have or may have COVID-19.  Cover your cough and sneezes with a tissue or use the inside of your elbow.  Wash your hands often with soap and water for at least 20 seconds or clean your hands with an alcohol-based hand  that contains at least 60% alcohol.  As much as possible, stay in a specific room and away from other people in your home. Also, you should use a separate bathroom, if available. If you need to be around other people in or outside of the home, wear a mask.  Avoid sharing personal items with other people in your household, like dishes, towels, and bedding.  Clean all surfaces that are touched often, like counters, tabletops, and doorknobs. Use household cleaning sprays or wipes according to the label instructions.  cdc.gov/coronavirus  07/16/2021  This information is not intended to replace advice given to you by your health care provider. Make sure you discuss any questions you have with your health care provider.  Document Revised: 11/01/2021 Document Reviewed: 11/01/2021  [employer] Patient Education © 2021 [employer]

## 2022-07-25 RX ORDER — LOSARTAN POTASSIUM 25 MG/1
TABLET ORAL
Qty: 90 TABLET | Refills: 2 | Status: SHIPPED | OUTPATIENT
Start: 2022-07-25 | End: 2023-01-05 | Stop reason: SDUPTHER

## 2022-08-17 RX ORDER — TRIAMCINOLONE ACETONIDE 1 MG/G
CREAM TOPICAL 2 TIMES DAILY
Qty: 45 G | Refills: 3 | Status: SHIPPED | OUTPATIENT
Start: 2022-08-17

## 2022-08-18 ENCOUNTER — TELEPHONE (OUTPATIENT)
Dept: GASTROENTEROLOGY | Facility: CLINIC | Age: 62
End: 2022-08-18

## 2022-08-18 NOTE — TELEPHONE ENCOUNTER
Caller: Keaton Holloway    Relationship to patient: Self    Best call back number:  [phone]     Type of visit: COLONOSCOPY.     Requested date: NEXT AVAILABLE     Additional notes: PATIENT CALLED IN ASKING TO SCHEDULE COLONOSCOPY.

## 2022-08-22 ENCOUNTER — OFFICE VISIT (OUTPATIENT)
Dept: INTERNAL MEDICINE | Facility: CLINIC | Age: 62
End: 2022-08-22

## 2022-08-22 VITALS
HEART RATE: 77 BPM | WEIGHT: 155 LBS | BODY MASS INDEX: 24.33 KG/M2 | HEIGHT: 67 IN | SYSTOLIC BLOOD PRESSURE: 136 MMHG | DIASTOLIC BLOOD PRESSURE: 80 MMHG | RESPIRATION RATE: 18 BRPM | OXYGEN SATURATION: 99 %

## 2022-08-22 DIAGNOSIS — S69.91XA INJURY OF RIGHT HAND, INITIAL ENCOUNTER: Primary | ICD-10-CM

## 2022-08-22 DIAGNOSIS — S69.91XA INJURY OF RIGHT WRIST, INITIAL ENCOUNTER: ICD-10-CM
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
## 2022-08-22 PROCEDURE — 99213 OFFICE O/P EST LOW 20 MIN: CPT | Performed by: NURSE PRACTITIONER

## 2022-08-22 PROCEDURE — 73130 X-RAY EXAM OF HAND: CPT | Performed by: NURSE PRACTITIONER

## 2022-08-22 PROCEDURE — 73110 X-RAY EXAM OF WRIST: CPT | Performed by: NURSE PRACTITIONER

## 2022-08-22 NOTE — PROGRESS NOTES
Subjective   Keaton Holloway is a 62 y.o. male.   Chief Complaint   Patient presents with   • Fall   • Hand Pain     Vitals:    08/22/22 1059   BP: 136/80   Pulse: 77   Resp: 18   SpO2: 99%     No LMP for male patientUriel Sauceda is a 62 year old male patient of Dr Garza who is here for an acute visit. He fell while working [employer] on Friday 8/19/22. He grabbed onto the dock with his right hand.     Fall  There was no blood loss. Point of impact: right hand, wrist,  Pain location: right hand  The pain is mild. Pertinent negatives include no fever, numbness or tingling. He has tried ice, elevation and NSAID for the symptoms. The treatment provided moderate relief.   Hand Pain   The incident occurred 2 days ago. Incident location: on a boat  The injury mechanism was a fall. The quality of the pain is described as aching. The pain is moderate. Pertinent negatives include no numbness or tingling. He has tried elevation, ice, NSAIDs and rest for the symptoms. The treatment provided moderate relief.        The following portions of the patient's history were reviewed and updated as appropriate: allergies, current medications, past family history, past medical history, past social history, past surgical history and problem list.    Review of Systems   Constitutional: Negative for fever.   Respiratory: Negative.    Cardiovascular: Negative.    Neurological: Negative for tingling and numbness.       Objective   Physical Exam  Nursing note reviewed.   Musculoskeletal:      Right wrist: Swelling (brusing ) present. No tenderness. Normal pulse.      Right hand: Swelling and tenderness present. No deformity. Normal strength. Normal sensation. Normal capillary refill. Normal pulse.   Skin:     General: Skin is warm and dry.      Findings: Abrasion (on right wrist ) and bruising (right axilla, elbow, hand, wrist ) present.   Neurological:      Mental Status: He is alert and oriented to person, place, and time.   Psychiatric:          Mood and Affect: Mood normal.           X-Ray Extremity: right hand and wrist              Indication:  o Pain   o Injury      Findings: no evidence of fracture noted on right hand and wrist , will send to radiology for over read       Comparison with Prior Film:  o Not Available         Reviewed by Myself                                                                                   Assessment & Plan   Diagnoses and all orders for this visit:    1. Injury of right hand, initial encounter (Primary)  -     XR Hand 3+ View Right (In Office)    2. Injury of right wrist, initial encounter  -     XR Wrist 3+ View Right (In Office)    3. Fall, initial encounter  -     XR Wrist 3+ View Right (In Office)  -     XR Hand 3+ View Right (In Office)        Discussed my findings of xray with patient and will send for radiology read  Continue to rest, ice and elevate  Follow up as needed

## 2022-08-24 ENCOUNTER — TELEPHONE (OUTPATIENT)
Dept: INTERNAL MEDICINE | Facility: CLINIC | Age: 62
End: 2022-08-24

## 2022-08-24 NOTE — TELEPHONE ENCOUNTER
----- Message from VANGIE Welch sent at 8/24/2022 10:17 AM EDT -----  Please Call Mr Holloway and let him know that his xray shows no fracture or acute abnormality

## 2022-09-27 ENCOUNTER — PREP FOR SURGERY (OUTPATIENT)
Dept: OTHER | Facility: HOSPITAL | Age: 62
End: 2022-09-27

## 2022-09-27 DIAGNOSIS — K63.5 COLON POLYPS: Primary | ICD-10-CM

## 2022-10-24 DIAGNOSIS — I10 ESSENTIAL HYPERTENSION: ICD-10-CM

## 2022-10-24 DIAGNOSIS — E78.5 DYSLIPIDEMIA: Primary | ICD-10-CM

## 2022-10-24 DIAGNOSIS — F41.1 GENERALIZED ANXIETY DISORDER: ICD-10-CM

## 2022-10-24 DIAGNOSIS — Z86.010 HISTORY OF COLON POLYPS: ICD-10-CM

## 2022-10-24 DIAGNOSIS — Z87.438 HX OF ACUTE PROSTATITIS: ICD-10-CM

## 2022-10-27 LAB
ALBUMIN SERPL-MCNC: 4.2 G/DL (ref 3.5–5.2)
ALBUMIN/CREAT UR: 7 MG/G CREAT (ref 0–29)
ALBUMIN/GLOB SERPL: 1.8 G/DL
ALP SERPL-CCNC: 52 U/L (ref 39–117)
ALT SERPL-CCNC: 23 U/L (ref 1–41)
APPEARANCE UR: CLEAR
AST SERPL-CCNC: 22 U/L (ref 1–40)
BACTERIA #/AREA URNS HPF: NORMAL /HPF
BASOPHILS # BLD AUTO: 0.03 10*3/MM3 (ref 0–0.2)
BASOPHILS NFR BLD AUTO: 0.7 % (ref 0–1.5)
BILIRUB SERPL-MCNC: 0.6 MG/DL (ref 0–1.2)
BILIRUB UR QL STRIP: NEGATIVE
BUN SERPL-MCNC: 14 MG/DL (ref 8–23)
BUN/CREAT SERPL: 12.5 (ref 7–25)
CALCIUM SERPL-MCNC: 9.1 MG/DL (ref 8.6–10.5)
CASTS URNS QL MICRO: NORMAL /LPF
CHLORIDE SERPL-SCNC: 109 MMOL/L (ref 98–107)
CHOLEST SERPL-MCNC: 188 MG/DL (ref 0–200)
CO2 SERPL-SCNC: 27.8 MMOL/L (ref 22–29)
COLOR UR: YELLOW
CREAT SERPL-MCNC: 1.12 MG/DL (ref 0.76–1.27)
CREAT UR-MCNC: 197.5 MG/DL
EGFRCR SERPLBLD CKD-EPI 2021: 73.8 ML/MIN/1.73
EOSINOPHIL # BLD AUTO: 0.11 10*3/MM3 (ref 0–0.4)
EOSINOPHIL NFR BLD AUTO: 2.7 % (ref 0.3–6.2)
EPI CELLS #/AREA URNS HPF: NORMAL /HPF (ref 0–10)
ERYTHROCYTE [DISTWIDTH] IN BLOOD BY AUTOMATED COUNT: 13.1 % (ref 12.3–15.4)
GLOBULIN SER CALC-MCNC: 2.3 GM/DL
GLUCOSE SERPL-MCNC: 92 MG/DL (ref 65–99)
GLUCOSE UR QL STRIP: NEGATIVE
HBA1C MFR BLD: 5.4 % (ref 4.8–5.6)
HCT VFR BLD AUTO: 44.1 % (ref 37.5–51)
HDLC SERPL-MCNC: 42 MG/DL (ref 40–60)
HGB BLD-MCNC: 14.9 G/DL (ref 13–17.7)
HGB UR QL STRIP: NEGATIVE
IMM GRANULOCYTES # BLD AUTO: 0.02 10*3/MM3 (ref 0–0.05)
IMM GRANULOCYTES NFR BLD AUTO: 0.5 % (ref 0–0.5)
KETONES UR QL STRIP: NEGATIVE
LDLC SERPL CALC-MCNC: 123 MG/DL (ref 0–100)
LEUKOCYTE ESTERASE UR QL STRIP: NEGATIVE
LYMPHOCYTES # BLD AUTO: 1.3 10*3/MM3 (ref 0.7–3.1)
LYMPHOCYTES NFR BLD AUTO: 31.7 % (ref 19.6–45.3)
MCH RBC QN AUTO: 29.3 PG (ref 26.6–33)
MCHC RBC AUTO-ENTMCNC: 33.8 G/DL (ref 31.5–35.7)
MCV RBC AUTO: 86.8 FL (ref 79–97)
MICRO URNS: NORMAL
MICRO URNS: NORMAL
MICROALBUMIN UR-MCNC: 14.1 UG/ML
MONOCYTES # BLD AUTO: 0.39 10*3/MM3 (ref 0.1–0.9)
MONOCYTES NFR BLD AUTO: 9.5 % (ref 5–12)
NEUTROPHILS # BLD AUTO: 2.25 10*3/MM3 (ref 1.7–7)
NEUTROPHILS NFR BLD AUTO: 54.9 % (ref 42.7–76)
NITRITE UR QL STRIP: NEGATIVE
NRBC BLD AUTO-RTO: 0 /100 WBC (ref 0–0.2)
PH UR STRIP: 6 [PH] (ref 5–7.5)
PLATELET # BLD AUTO: 213 10*3/MM3 (ref 140–450)
POTASSIUM SERPL-SCNC: 4.6 MMOL/L (ref 3.5–5.2)
PROT SERPL-MCNC: 6.5 G/DL (ref 6–8.5)
PROT UR QL STRIP: NEGATIVE
PSA SERPL-MCNC: 1.93 NG/ML (ref 0–4)
RBC # BLD AUTO: 5.08 10*6/MM3 (ref 4.14–5.8)
RBC #/AREA URNS HPF: NORMAL /HPF (ref 0–2)
SODIUM SERPL-SCNC: 143 MMOL/L (ref 136–145)
SP GR UR STRIP: 1.02 (ref 1–1.03)
TRIGL SERPL-MCNC: 130 MG/DL (ref 0–150)
TSH SERPL DL<=0.005 MIU/L-ACNC: 1.18 UIU/ML (ref 0.27–4.2)
URINALYSIS REFLEX: NORMAL
UROBILINOGEN UR STRIP-MCNC: 0.2 MG/DL (ref 0.2–1)
VLDLC SERPL CALC-MCNC: 23 MG/DL (ref 5–40)
WBC # BLD AUTO: 4.1 10*3/MM3 (ref 3.4–10.8)
WBC #/AREA URNS HPF: NORMAL /HPF (ref 0–5)

## 2022-10-31 ENCOUNTER — OFFICE VISIT (OUTPATIENT)
Dept: INTERNAL MEDICINE | Facility: CLINIC | Age: 62
End: 2022-10-31

## 2022-10-31 VITALS
HEART RATE: 82 BPM | BODY MASS INDEX: 24.84 KG/M2 | HEIGHT: 67 IN | OXYGEN SATURATION: 96 % | TEMPERATURE: 98.2 F | WEIGHT: 158.3 LBS | DIASTOLIC BLOOD PRESSURE: 78 MMHG | SYSTOLIC BLOOD PRESSURE: 120 MMHG

## 2022-10-31 DIAGNOSIS — N20.0 NEPHROLITHIASIS: ICD-10-CM

## 2022-10-31 DIAGNOSIS — E78.5 DYSLIPIDEMIA: ICD-10-CM

## 2022-10-31 DIAGNOSIS — I10 ESSENTIAL HYPERTENSION: ICD-10-CM

## 2022-10-31 DIAGNOSIS — Z23 NEEDS FLU SHOT: ICD-10-CM

## 2022-10-31 DIAGNOSIS — Z00.01 ENCOUNTER FOR GENERAL ADULT MEDICAL EXAMINATION WITH ABNORMAL FINDINGS: Primary | ICD-10-CM

## 2022-10-31 DIAGNOSIS — J30.89 ENVIRONMENTAL AND SEASONAL ALLERGIES: ICD-10-CM

## 2022-10-31 DIAGNOSIS — F41.1 GENERALIZED ANXIETY DISORDER: ICD-10-CM

## 2022-10-31 DIAGNOSIS — K42.9 UMBILICAL HERNIA WITHOUT OBSTRUCTION AND WITHOUT GANGRENE: ICD-10-CM

## 2022-10-31 DIAGNOSIS — K40.90 NON-RECURRENT UNILATERAL INGUINAL HERNIA WITHOUT OBSTRUCTION OR GANGRENE: ICD-10-CM

## 2022-10-31 DIAGNOSIS — Z86.010 HISTORY OF COLON POLYPS: ICD-10-CM

## 2022-10-31 DIAGNOSIS — Z87.438 HX OF ACUTE PROSTATITIS: ICD-10-CM

## 2022-10-31 PROCEDURE — 90471 IMMUNIZATION ADMIN: CPT | Performed by: INTERNAL MEDICINE

## 2022-10-31 PROCEDURE — 90686 IIV4 VACC NO PRSV 0.5 ML IM: CPT | Performed by: INTERNAL MEDICINE

## 2022-10-31 PROCEDURE — 99396 PREV VISIT EST AGE 40-64: CPT | Performed by: INTERNAL MEDICINE

## 2022-10-31 RX ORDER — FEXOFENADINE HCL 180 MG/1
180 TABLET ORAL DAILY
COMMUNITY
End: 2023-03-09

## 2022-10-31 RX ORDER — AZELASTINE HCL 205.5 UG/1
2 SPRAY NASAL
COMMUNITY

## 2022-10-31 RX ORDER — ESCITALOPRAM OXALATE 10 MG/1
10 TABLET ORAL DAILY
Qty: 90 TABLET | Refills: 3 | Status: SHIPPED | OUTPATIENT
Start: 2022-10-31

## 2022-10-31 NOTE — PROGRESS NOTES
"Annual Exam (CPE. Review of medical issues)      HPI  Keaton Holloway is a 62 y.o. male RTC in yearly CPE, review of medical issues:  OVerall has been doing well. Had COVID in 7/2022 and had high fever and sore throat but resolved with Paxlovid use.  No issues once over it. \"It was rough\".   1. Essential hypertension - on one drug.  Not been checking at home. Got ~120/80 when at allergist in summer.   2. Dyslipidemia - is still active overall with grandson and farm. Diet 'has been pretty good'.  Weight did get up to 158#, but has moved to cutting out snacking.  Active with golf.   3. CHRIS vs. Adjustment D/O with anxiety - did not tolerate wean to 1/2 dose with noted home stressors in 2021, mood controlled on 10mg escitalopram daily. Mood has held well over year.   4. Olecranon bursitis 4/2021 - sudden onset. S/P Abx from ortho, resolved.   5. Nephrolithiasis - recurrent in 2020 s/p retrieval; persists with one retained stone. Saw urology 10/2022 and had XR with retained stone, no change.   6. CHRIS vs. Adjustment D/O with anxiety - started about 5 years ago with job stress. Retired now and feeling well, desires wean on meds.  Change to 5mg Lexapro daily for 3 months and reassess.    7. Environmental Allergies - on immunotherapy for years, intolerant off in past.  Retested 9/2022 due to lack of benefit over time on shots. Planning new round of shots in 11/2022 with rapid uptitration. Has added Astepro daily.   8. Hx of BCC - dx'd by derm, removed OPT ~2016. Sees Dr. Nieves annually and has appt 11/2022.  No new lesions of concern.   9. HM - wants flu shot today. Asks if should take updated COVID booster at this time after COVID in 7/2022.         Review of Systems   Constitutional: Negative for chills, fever, malaise/fatigue, weight gain (a few pounds in last few months. ) and weight loss.   HENT: Negative for congestion, hearing loss, odynophagia and sore throat.    Eyes: Negative for discharge, double vision, pain and " redness.        Last eye exam 6/2022, wears glasses     Cardiovascular: Negative for chest pain, dyspnea on exertion, irregular heartbeat, leg swelling, near-syncope, palpitations and syncope.   Respiratory: Positive for cough (mild, x 1 week; caught mild cold from grandson) and snoring ('a little bit' ). Negative for shortness of breath and sleep disturbances due to breathing.    Endocrine: Negative for polydipsia, polyphagia and polyuria.   Hematologic/Lymphatic: Negative for bleeding problem. Does not bruise/bleed easily.   Skin: Negative for rash and suspicious lesions.   Musculoskeletal: Negative for joint pain, joint swelling, muscle cramps, muscle weakness and myalgias.   Gastrointestinal: Negative for constipation, diarrhea, dysphagia, heartburn, nausea and vomiting.   Genitourinary: Negative for bladder incontinence, dysuria, frequency, hematuria, hesitancy, incomplete emptying and nocturia.   Neurological: Negative for excessive daytime sleepiness, dizziness, headaches and light-headedness.   Psychiatric/Behavioral: Negative for depression. The patient does not have insomnia and is not nervous/anxious.    Allergic/Immunologic: Positive for environmental allergies. Negative for persistent infections.       Problem List:    Patient Active Problem List   Diagnosis   • Generalized anxiety disorder   • Environmental and seasonal allergies   • Hx of acute prostatitis   • Diverticulosis of large intestine without hemorrhage   • History of colon polyps   • Essential hypertension   • Umbilical hernia without obstruction and without gangrene   • Non-recurrent unilateral inguinal hernia   • Nephrolithiasis   • Dyslipidemia       Medical History:    Past Medical History:   Diagnosis Date   • COVID-19 07/2022   • Diverticulosis of large intestine without hemorrhage 08/08/2019    Noted on C-scope 6/2017   • Environmental allergies 08/08/2019    On immunotherapy since 1982. Peanut, celery, and apple in raw form cause  itchiness in throat; no hx of anaphylaxis.    • Generalized anxiety disorder 02/23/2016   • Hypertension    • Nephrolithiasis 07/12/2021    S/p retrieval in 2020; retained stone noted.    • Olecranon bursitis, right elbow 04/2021    seen in ED   • Plantar warts     in youth   • Prostatitis, acute     2013 and 2014   • Shingles     in college        Social History:    Social History     Socioeconomic History   • Marital status:    Tobacco Use   • Smoking status: Never   • Smokeless tobacco: Never   Vaping Use   • Vaping Use: Never used   Substance and Sexual Activity   • Alcohol use: Yes     Alcohol/week: 2.0 standard drinks     Types: 2 Standard drinks or equivalent per week     Comment: 1-2 drinks/ week   • Drug use: No   • Sexual activity: Yes     Partners: Female     Comment: wife only; no hx of STD's       Family History:   Family History   Problem Relation Age of Onset   • Heart attack Mother    • Osteoarthritis Mother    • Osteoarthritis Father    • Hypertension Father    • Hypertension Sister    • Lung cancer Paternal Grandmother    • Cerebral aneurysm Paternal Grandfather         hemorrhaged   • No Known Problems Son        Surgical History:   Past Surgical History:   Procedure Laterality Date   • COLONOSCOPY  11/30/2012    Within normal limits. 3mm hyperplastic polyp; Dr. Miller. TV adenoma in 03/2010   • COLONOSCOPY N/A 6/30/2017    Procedure: COLONOSCOPY into cecum and TI;  Surgeon: Van Miller MD;  Location: [facility];  Service:    • ENDOSCOPY  03/2010    Mild Gastritis; Dr. Miller   • MOLE REMOVAL      benign         Current Outpatient Medications:   •  azelastine (ASTEPRO) 0.15 % solution nasal spray, 2 sprays into the nostril(s) as directed by provider 2 (Two) Times a Day., Disp: , Rfl:   •  escitalopram (LEXAPRO) 10 MG tablet, Take 1 tablet by mouth Daily., Disp: 90 tablet, Rfl: 3  •  fexofenadine (ALLEGRA) 180 MG tablet, Take 1 tablet by mouth Daily., Disp: , Rfl:   •  fluticasone  (VERAMYST) 27.5 MCG/SPRAY nasal spray, 2 sprays into the nostril(s) as directed by provider Daily., Disp: , Rfl:   •  losartan (COZAAR) 25 MG tablet, TAKE 1 TABLET BY MOUTH EVERY DAY, Disp: 90 tablet, Rfl: 2  •  Saw Palmetto 1000 MG capsule, Take  by mouth., Disp: , Rfl:   •  Diclofenac Sodium (VOLTAREN) 1 % gel gel, Apply 4 g topically to the appropriate area as directed 4 (Four) Times a Day., Disp: , Rfl:   •  triamcinolone (KENALOG) 0.1 % cream, Apply  topically to the appropriate area as directed 2 (Two) Times a Day., Disp: 45 g, Rfl: 3    Vitals:    10/31/22 1123   BP: 120/78   Pulse: 82   Temp: 98.2 °F (36.8 °C)   SpO2: 96%     Body mass index is 24.79 kg/m².    Physical Exam  Vitals reviewed.   Constitutional:       General: He is not in acute distress.     Appearance: Normal appearance. He is well-developed. He is not ill-appearing or toxic-appearing.   HENT:      Head: Normocephalic and atraumatic.      Right Ear: Hearing, tympanic membrane, ear canal and external ear normal. There is no impacted cerumen.      Left Ear: Hearing, tympanic membrane, ear canal and external ear normal. There is no impacted cerumen.      Nose: Nose normal.      Mouth/Throat:      Mouth: Mucous membranes are moist. No oral lesions.      Tongue: No lesions.      Pharynx: Oropharynx is clear. Uvula midline. No pharyngeal swelling, oropharyngeal exudate, posterior oropharyngeal erythema or uvula swelling.   Eyes:      General: Lids are normal. No scleral icterus.        Right eye: No discharge.         Left eye: No discharge.      Extraocular Movements: Extraocular movements intact.      Conjunctiva/sclera: Conjunctivae normal.      Pupils: Pupils are equal, round, and reactive to light.   Neck:      Thyroid: No thyroid mass or thyromegaly.      Vascular: No carotid bruit.   Cardiovascular:      Rate and Rhythm: Normal rate and regular rhythm.      Pulses:           Radial pulses are 2+ on the right side and 2+ on the left side.         Dorsalis pedis pulses are 2+ on the right side and 2+ on the left side.        Posterior tibial pulses are 2+ on the right side and 2+ on the left side.      Heart sounds: Normal heart sounds, S1 normal and S2 normal. No murmur heard.    No friction rub. No gallop.   Pulmonary:      Effort: Pulmonary effort is normal. No respiratory distress.      Breath sounds: Normal breath sounds. No wheezing, rhonchi or rales.   Abdominal:      General: Bowel sounds are normal. There is no distension.      Palpations: Abdomen is soft. There is no mass.      Tenderness: There is no abdominal tenderness. There is no guarding or rebound.   Genitourinary:     Prostate: Enlarged (diffuse, symmetric). Not tender and no nodules present.      Rectum: Normal. No external hemorrhoid. Normal anal tone.   Musculoskeletal:         General: No deformity. Normal range of motion.      Cervical back: Full passive range of motion without pain, normal range of motion and neck supple.      Right lower leg: No edema.      Left lower leg: No edema.   Lymphadenopathy:      Cervical: No cervical adenopathy.      Right cervical: No superficial, deep or posterior cervical adenopathy.     Left cervical: No superficial, deep or posterior cervical adenopathy.      Upper Body:      Right upper body: No supraclavicular, axillary or pectoral adenopathy.      Left upper body: No supraclavicular, axillary or pectoral adenopathy.   Skin:     General: Skin is warm and dry.      Findings: Lesion (pinpoint scab on anterior mid nose, no erythema. ) present. No rash.   Neurological:      Mental Status: He is alert and oriented to person, place, and time.      Cranial Nerves: No cranial nerve deficit.      Sensory: No sensory deficit.      Motor: No weakness, tremor, atrophy or abnormal muscle tone.      Gait: Gait normal.      Deep Tendon Reflexes: Reflexes are normal and symmetric.      Reflex Scores:       Patellar reflexes are 2+ on the right side and 2+ on the  left side.       Achilles reflexes are 2+ on the right side and 2+ on the left side.  Psychiatric:         Attention and Perception: Attention normal.         Mood and Affect: Mood normal.         Speech: Speech normal.         Behavior: Behavior normal. Behavior is cooperative.         Thought Content: Thought content normal.         Assessment/ Plan  Diagnoses and all orders for this visit:    Encounter for general adult medical examination with abnormal findings    Generalized anxiety disorder  -     escitalopram (LEXAPRO) 10 MG tablet; Take 1 tablet by mouth Daily.    Dyslipidemia    Environmental and seasonal allergies    Essential hypertension    History of colon polyps    Hx of acute prostatitis    Nephrolithiasis    Non-recurrent unilateral inguinal hernia without obstruction or gangrene    Umbilical hernia without obstruction and without gangrene    Needs flu shot    Other orders  -     Discontinue: Fexofenadine-Pseudoephedrine (ALLEGRA-D 24 HOUR PO); Take 1 tablet by mouth Daily.  -     azelastine (ASTEPRO) 0.15 % solution nasal spray; 2 sprays into the nostril(s) as directed by provider 2 (Two) Times a Day.  -     fexofenadine (ALLEGRA) 180 MG tablet; Take 1 tablet by mouth Daily.  -     FluLaval/Fluzone >6 mos ([id number])        Return in about 6 months (around 4/30/2023) for Recheck.      Discussion:  Keaton Holloway is a 62 y.o. male RTC in yearly CPE, review of medical issues:   1. Essential hypertension - controlled on one drug. Restart home log.  BMP OK.   2. Dyslipidemia - Low HDL issue in past, borderline today. hsCRP low on 10/2021 labs.  Recall, few true CV risks and 10 yr CR calculation prone to overestimate based on male sex and age.  I do not think pt is statin candidate. CV exercise addition advised.   3. CHRIS vs. Adjustment D/O with anxiety - did not tolerate wean to 1/2 dose with noted home stressors in 2021, mood controlled on 10mg escitalopram daily. C/W same.   4. Hx of Prostatitis  infection x 2 since 2013 - [facility].   5. Nephrolithiasis - recurrent in 2020 s/p retrieval; persists with one retained stone. UTD urology 10/2022, stable XR with retained stone per note reviewed today. Monitor for sx per Dr. Mancilla in urology.   6. Environmental Allergies - on immunotherapy for years, retested 9/2022 due to decline in benefit. New formula shots in 11/2022 with rapid uptitration planned. C/W Astepro and Veramyst daily with Allegra.   7. Hx of BCC - dx'd by derm, removed OPT ~2016. Sees Dr. Nieves annually,  appt 11/2022. Exam benign today.   8. Inguinal hernia (chronic) and new small, reducible umbilical hernia - watchful waiting. D/W pt s/s incarcerated hernia and to ED if occurs   9. HM - labs d/w pt; Flu - today; Tdap/ Hep A/ Shingrix/ COVID - UTD; COVID booster advised; C-scope 6/2017 (-) --> 5 years (hx of polyps), has appt; GEOVANY/ PSA OK;  Hep C Ab (-) 8/2019; add more exercise.       RTC 6 months, F labs prior (CMP, FLP)

## 2022-11-18 ENCOUNTER — TELEPHONE (OUTPATIENT)
Dept: GASTROENTEROLOGY | Facility: CLINIC | Age: 62
End: 2022-11-18

## 2022-11-18 NOTE — TELEPHONE ENCOUNTER
JOLLY patient via telephone for. Scheduled 03/10/2023 with arrival time of 1000AM. Prep paperwork mailed to verified address on file. Patient advised arrival time may change based on [facility] with [facility] guidelines. JOLLY FLOWER

## 2022-12-08 ENCOUNTER — TELEPHONE (OUTPATIENT)
Dept: GASTROENTEROLOGY | Facility: CLINIC | Age: 62
End: 2022-12-08

## 2022-12-08 NOTE — TELEPHONE ENCOUNTER
Caller: Keaton Holloway    Relationship to patient: Self    Best call back number: [phone]    Patient is needing: PT RTN CALL FROM OFFICE TO SCHEDULE COLONOSCOPY.

## 2022-12-12 PROBLEM — K63.5 COLON POLYPS: Status: ACTIVE | Noted: 2022-12-12

## 2022-12-12 NOTE — TELEPHONE ENCOUNTER
JOLLY Barrera for COLONOSCOPY on 3/10/23 arrive at 1230pm.Prep instructions mailed to address on file.

## 2023-01-05 RX ORDER — LOSARTAN POTASSIUM 25 MG/1
25 TABLET ORAL DAILY
Qty: 90 TABLET | Refills: 2 | Status: SHIPPED | OUTPATIENT
Start: 2023-01-05

## 2023-01-05 NOTE — TELEPHONE ENCOUNTER
Caller: Keaton Holloway    Relationship: Self    Best call back number: [phone]    Requested Prescriptions:   Requested Prescriptions     Pending Prescriptions Disp Refills   • losartan (COZAAR) 25 MG tablet 90 tablet 2     Sig: Take 1 tablet by mouth Daily.        Pharmacy where request should be sent: [facility]/PHARMACY #[id number] [place], [place] - [address] - [phone]  - [phone] FX     Additional details provided by patient: PATIENT STATES THAT HIS NEW INSURANCE REQUIRES 30 DAY SUPPLIES INSTEAD OF 90 DAY.    Does the patient have less than a 3 day supply:  [x] Yes  [] No    Would you like a call back once the refill request has been completed: [x] Yes [] No    If the office needs to give you a call back, can they leave a voicemail: [x] Yes [] No    Juliette Yao Rep   01/05/23 14:25 EST

## 2023-03-10 ENCOUNTER — ANESTHESIA EVENT (OUTPATIENT)
Dept: GASTROENTEROLOGY | Facility: HOSPITAL | Age: 63
End: 2023-03-10
Payer: COMMERCIAL

## 2023-03-10 ENCOUNTER — HOSPITAL ENCOUNTER (OUTPATIENT)
Facility: HOSPITAL | Age: 63
Setting detail: HOSPITAL OUTPATIENT SURGERY
Discharge: HOME | End: 2023-03-10
Attending: INTERNAL MEDICINE | Admitting: INTERNAL MEDICINE
Payer: COMMERCIAL

## 2023-03-10 ENCOUNTER — ANESTHESIA (OUTPATIENT)
Dept: GASTROENTEROLOGY | Facility: HOSPITAL | Age: 63
End: 2023-03-10
Payer: COMMERCIAL

## 2023-03-10 VITALS
WEIGHT: 156 LBS | RESPIRATION RATE: 14 BRPM | DIASTOLIC BLOOD PRESSURE: 89 MMHG | HEIGHT: 67 IN | OXYGEN SATURATION: 100 % | SYSTOLIC BLOOD PRESSURE: 110 MMHG | HEART RATE: 67 BPM | BODY MASS INDEX: 24.48 KG/M2

## 2023-03-10 DIAGNOSIS — K63.5 COLON POLYPS: ICD-10-CM

## 2023-03-10 PROCEDURE — 25010000002 PROPOFOL 10 MG/ML EMULSION: Performed by: NURSE ANESTHETIST, CERTIFIED REGISTERED

## 2023-03-10 PROCEDURE — 45380 COLONOSCOPY AND BIOPSY: CPT | Performed by: INTERNAL MEDICINE

## 2023-03-10 PROCEDURE — 88305 TISSUE EXAM BY PATHOLOGIST: CPT | Performed by: INTERNAL MEDICINE

## 2023-03-10 RX ORDER — PROPOFOL 10 MG/ML
VIAL (ML) INTRAVENOUS CONTINUOUS PRN
Status: DISCONTINUED | OUTPATIENT
Start: 2023-03-10 | End: 2023-03-10 | Stop reason: SURG

## 2023-03-10 RX ORDER — SODIUM CHLORIDE, SODIUM LACTATE, POTASSIUM CHLORIDE, CALCIUM CHLORIDE 600; 310; 30; 20 MG/100ML; MG/100ML; MG/100ML; MG/100ML
1000 INJECTION, SOLUTION INTRAVENOUS CONTINUOUS
Status: DISCONTINUED | OUTPATIENT
Start: 2023-03-10 | End: 2023-03-10 | Stop reason: HOSPADM

## 2023-03-10 RX ORDER — LIDOCAINE HYDROCHLORIDE 20 MG/ML
INJECTION, SOLUTION INFILTRATION; PERINEURAL AS NEEDED
Status: DISCONTINUED | OUTPATIENT
Start: 2023-03-10 | End: 2023-03-10 | Stop reason: SURG

## 2023-03-10 RX ORDER — PROPOFOL 10 MG/ML
VIAL (ML) INTRAVENOUS AS NEEDED
Status: DISCONTINUED | OUTPATIENT
Start: 2023-03-10 | End: 2023-03-10 | Stop reason: SURG

## 2023-03-10 RX ADMIN — PROPOFOL 80 MG: 10 INJECTION, EMULSION INTRAVENOUS at 13:31

## 2023-03-10 RX ADMIN — SODIUM CHLORIDE, POTASSIUM CHLORIDE, SODIUM LACTATE AND CALCIUM CHLORIDE 1000 ML: 600; 310; 30; 20 INJECTION, SOLUTION INTRAVENOUS at 12:08

## 2023-03-10 RX ADMIN — Medication 200 MCG/KG/MIN: at 13:31

## 2023-03-10 RX ADMIN — LIDOCAINE HYDROCHLORIDE 60 MG: 20 INJECTION, SOLUTION INFILTRATION; PERINEURAL at 13:31

## 2023-03-10 NOTE — ANESTHESIA POSTPROCEDURE EVALUATION
"Patient: Keaton Holloway    Procedure Summary     Date: 03/10/23 Room / Location:  [facility]    Anesthesia Start: 1323 Anesthesia Stop: 1356    Procedure: COLONOSCOPY INTO CECUM AND TI WITH POLYPECTOMIES (COLD BX) Diagnosis:       Colon polyps      (Colon polyps [K63.5])    Surgeons: Van Miller MD Provider: Nicky Mancilla MD    Anesthesia Type: MAC ASA Status: 1          Anesthesia Type: MAC    Vitals  Vitals Value Taken Time   /89 03/10/23 1414   Temp     Pulse 67 03/10/23 1414   Resp 14 03/10/23 1414   SpO2 100 % 03/10/23 1414           Post Anesthesia Care and Evaluation    Patient location during evaluation: bedside  Patient participation: complete - patient participated  Level of consciousness: awake and alert  Pain management: adequate    Airway patency: patent  Anesthetic complications: No anesthetic complications    Cardiovascular status: acceptable  Respiratory status: acceptable  Hydration status: acceptable    Comments: /89 (BP Location: Left arm, Patient Position: Sitting)   Pulse 67   Resp 14   Ht 170.2 cm (67\")   Wt 70.8 kg (156 lb)   SpO2 100%   BMI 24.43 kg/m²       "

## 2023-03-10 NOTE — ANESTHESIA PREPROCEDURE EVALUATION
Anesthesia Evaluation     no history of anesthetic complications:               Airway   Mallampati: I  TM distance: >3 FB  Neck ROM: full  Dental - normal exam     Pulmonary    (-) shortness of breath, recent URI, not a smoker  Cardiovascular     (+) hypertension,   (-) dysrhythmias, angina      Neuro/Psych  (-) dizziness/light headedness, syncope  GI/Hepatic/Renal/Endo    (+)   renal disease,   (-) liver disease    Musculoskeletal     Abdominal    Substance History      OB/GYN          Other                        Anesthesia Plan    ASA 1     MAC     intravenous induction     Anesthetic plan, risks, benefits, and alternatives have been provided, discussed and informed consent has been obtained with: patient.        CODE STATUS:

## 2023-03-10 NOTE — DISCHARGE INSTRUCTIONS
For the next 24 hours patient needs to be with a responsible adult.    For 24 hours DO NOT drive, operate machinery, appliances, drink alcohol, make important decisions or sign legal documents.    Start with a light or bland diet if you are feeling sick to your stomach otherwise advance to regular diet as tolerated.    Follow recommendations on procedure report if provided by your doctor.    Call Dr Miller for problems [phone]    Problems may include but not limited to: large amounts of bleeding, trouble breathing, repeated vomiting, severe unrelieved pain, fever or chills.

## 2023-03-10 NOTE — H&P
[facility]  Pre Procedure History & Physical    Chief Complaint:   Time for my colonoscopy    Subjective     HPI:   63 y.o. male retired  of [employer] with a history of colon polyps.  His last colonoscopy was in June 2017.    Past Medical History:   Past Medical History:   Diagnosis Date   • COVID-19 07/2022   • Diverticulosis of large intestine without hemorrhage 08/08/2019    Noted on C-scope 6/2017   • Environmental allergies 08/08/2019    On immunotherapy since 1982. Peanut, celery, and apple in raw form cause itchiness in throat; no hx of anaphylaxis.    • Generalized anxiety disorder 02/23/2016   • Hypertension    • Nephrolithiasis 07/12/2021    S/p retrieval in 2020; retained stone noted.    • Olecranon bursitis, right elbow 04/2021    seen in ED   • Plantar warts     in youth   • Prostatitis, acute     2013 and 2014   • Shingles     in college       Family History:  Family History   Problem Relation Age of Onset   • Heart attack Mother    • Osteoarthritis Mother    • Osteoarthritis Father    • Hypertension Father    • Hypertension Sister    • No Known Problems Son    • Lung cancer Paternal Grandmother    • Cerebral aneurysm Paternal Grandfather         hemorrhaged   • Malig Hyperthermia Neg Hx        Social History:   reports that he has never smoked. He has never used smokeless tobacco. He reports current alcohol use of about 2.0 standard drinks per week. He reports that he does not use drugs.    Medications:   Medications Prior to Admission   Medication Sig Dispense Refill Last Dose   • azelastine (ASTEPRO) 0.15 % solution nasal spray 2 sprays into the nostril(s) as directed by provider.   3/9/2023   • Diclofenac Sodium (VOLTAREN) 1 % gel gel Apply 4 g topically to the appropriate area as directed 4 (Four) Times a Day.   3/9/2023   • escitalopram (LEXAPRO) 10 MG tablet Take 1 tablet by mouth Daily. 90 tablet 3 3/9/2023   • fluticasone (VERAMYST) 27.5 MCG/SPRAY nasal  "spray 2 sprays into the nostril(s) as directed by provider 2 (Two) Times a Day.      • losartan (COZAAR) 25 MG tablet Take 1 tablet by mouth Daily. 90 tablet 2 3/9/2023   • Saw Palmetto 1000 MG capsule Take  by mouth.   3/9/2023   • triamcinolone (KENALOG) 0.1 % cream Apply  topically to the appropriate area as directed 2 (Two) Times a Day. 45 g 3 3/9/2023       Allergies:  Patient has no known allergies.    ROS:    Pertinent items are noted in HPI     Objective     Blood pressure (!) 126/101, pulse 64, resp. rate 16, height 170.2 cm (67\"), weight 70.8 kg (156 lb), SpO2 100 %.    Physical Exam   Constitutional: Pt is oriented to person, place, and time and well-developed, well-nourished, and in no distress.   HENT:   Mouth/Throat: Oropharynx is clear and moist.   Neck: Normal range of motion. Neck supple.   Cardiovascular: Normal rate, regular rhythm and normal heart sounds.    Pulmonary/Chest: Effort normal and breath sounds normal. No respiratory distress. No  wheezes.   Abdominal: Soft. Bowel sounds are normal.   Skin: Skin is warm and dry.   Psychiatric: Mood, memory, affect and judgment normal.     Assessment & Plan     Diagnosis:  63 y.o. male retired  of [employer] with a history of colon polyps.  His last colonoscopy was in June 2017.    Anticipated Surgical Procedure:  Colonoscopy    The risks, benefits, and alternatives of this procedure have been discussed with the patient or the responsible party- the patient understands and agrees to proceed.    Van Miller M.D.  "

## 2023-03-13 ENCOUNTER — OFFICE VISIT (OUTPATIENT)
Dept: INTERNAL MEDICINE | Facility: CLINIC | Age: 63
End: 2023-03-13
Payer: COMMERCIAL

## 2023-03-13 VITALS
HEART RATE: 63 BPM | OXYGEN SATURATION: 97 % | HEIGHT: 67 IN | SYSTOLIC BLOOD PRESSURE: 124 MMHG | TEMPERATURE: 97.7 F | DIASTOLIC BLOOD PRESSURE: 80 MMHG | BODY MASS INDEX: 24.48 KG/M2 | WEIGHT: 156 LBS

## 2023-03-13 DIAGNOSIS — J01.90 ACUTE SINUSITIS, RECURRENCE NOT SPECIFIED, UNSPECIFIED LOCATION: Primary | ICD-10-CM

## 2023-03-13 LAB
LAB AP CASE REPORT: NORMAL
PATH REPORT.FINAL DX SPEC: NORMAL
PATH REPORT.GROSS SPEC: NORMAL

## 2023-03-13 PROCEDURE — 99213 OFFICE O/P EST LOW 20 MIN: CPT | Performed by: INTERNAL MEDICINE

## 2023-03-13 RX ORDER — DOXYCYCLINE HYCLATE 100 MG/1
100 CAPSULE ORAL 2 TIMES DAILY
Qty: 14 CAPSULE | Refills: 0 | Status: SHIPPED | OUTPATIENT
Start: 2023-03-13

## 2023-03-13 NOTE — PROGRESS NOTES
Subjective     Keaton Holloway is a 63 y.o. male who presents with   Chief Complaint   Patient presents with   • Cough   • Nasal Congestion       History of Present Illness     Persistent sinus infection for the past two weeks.  He had taken amoxil for ten days and then it came back after five days.  No fever.  No teeth pain. Sinus pain and pressure.  Cough with production.      Review of Systems   Constitutional: Negative for fever.   HENT: Positive for congestion.    Respiratory: Positive for cough. Negative for shortness of breath and wheezing.        The following portions of the patient's history were reviewed and updated as appropriate: allergies, current medications and problem list.    Patient Active Problem List    Diagnosis Date Noted   • Colon polyps 12/12/2022     Note Last Updated: 12/12/2022     Added automatically from request for surgery [id number]     • Dyslipidemia 04/22/2022     Note Last Updated: 4/22/2022     Low HDL, in past     • Umbilical hernia without obstruction and without gangrene 07/12/2021   • Non-recurrent unilateral inguinal hernia 07/12/2021   • Nephrolithiasis 07/12/2021     Note Last Updated: 7/12/2021     S/p retrieval in 2020; retained stone noted.      • Essential hypertension 08/19/2019     Note Last Updated: 8/19/2019     New dx 8/2019     • Environmental and seasonal allergies 08/08/2019     Note Last Updated: 10/31/2022     On immunotherapy since 1982. Peanut, celery, and apple in raw form cause itchiness in throat; no hx of anaphylaxis.   Decline in efficacy over time; retest in 2022 and new shots planned.      • Hx of acute prostatitis 08/08/2019     Note Last Updated: 8/8/2019     X 2      • Diverticulosis of large intestine without hemorrhage 08/08/2019     Note Last Updated: 8/8/2019     Noted on C-scope 6/2017     • History of colon polyps 08/08/2019   • Generalized anxiety disorder 02/23/2016     Note Last Updated: 8/8/2019     new dx ~2017; on Lexapro         Current  "Outpatient Medications on File Prior to Visit   Medication Sig Dispense Refill   • azelastine (ASTEPRO) 0.15 % solution nasal spray 2 sprays into the nostril(s) as directed by provider.     • Diclofenac Sodium (VOLTAREN) 1 % gel gel Apply 4 g topically to the appropriate area as directed 4 (Four) Times a Day.     • escitalopram (LEXAPRO) 10 MG tablet Take 1 tablet by mouth Daily. 90 tablet 3   • fluticasone (VERAMYST) 27.5 MCG/SPRAY nasal spray 2 sprays into the nostril(s) as directed by provider 2 (Two) Times a Day.     • losartan (COZAAR) 25 MG tablet Take 1 tablet by mouth Daily. 90 tablet 2   • Saw Palmetto 1000 MG capsule Take  by mouth.     • triamcinolone (KENALOG) 0.1 % cream Apply  topically to the appropriate area as directed 2 (Two) Times a Day. 45 g 3     No current facility-administered medications on file prior to visit.       Objective     /80   Pulse 63   Temp 97.7 °F (36.5 °C)   Ht 170.2 cm (67.01\")   Wt 70.8 kg (156 lb)   SpO2 97%   BMI 24.43 kg/m²     Physical Exam  Constitutional:       Appearance: He is well-developed.   HENT:      Head: Atraumatic.      Right Ear: Hearing and tympanic membrane normal.      Left Ear: Hearing and tympanic membrane normal.      Mouth/Throat:      Pharynx: No oropharyngeal exudate or posterior oropharyngeal erythema.   Cardiovascular:      Rate and Rhythm: Normal rate and regular rhythm.      Heart sounds: Normal heart sounds.   Pulmonary:      Effort: Pulmonary effort is normal.      Breath sounds: Normal breath sounds.   Skin:     General: Skin is warm and dry.   Neurological:      Mental Status: He is alert and oriented to person, place, and time.         Assessment & Plan   Diagnoses and all orders for this visit:    1. Acute sinusitis, recurrence not specified, unspecified location (Primary)    Other orders  -     doxycycline (VIBRAMYCIN) 100 MG capsule; Take 1 capsule by mouth 2 (Two) Times a Day.  Dispense: 14 capsule; Refill: " 0        Discussion    Patient presents with an acute sinus infection.  Rx for antibiotics are called in.  The patient is encouraged to take with OTC Mucinex D.  Let me know if not feeling better over the next 3 days or if there is any change in symptoms.           Future Appointments   Date Time Provider Department Center   5/1/2023  9:00 AM [facility]   5/8/2023  8:45 AM Tom Garza MD [facility]

## 2023-03-20 NOTE — PROGRESS NOTES
03/20/23       Tell him that the colon polyps that were removed were not cancerous but were precancerous.  I would recommend a repeat colonoscopy in 3 to 5 years.  Please send a copy of this report to his PCP.  Acosta delgado

## 2023-04-12 ENCOUNTER — TELEPHONE (OUTPATIENT)
Dept: GASTROENTEROLOGY | Facility: CLINIC | Age: 63
End: 2023-04-12
Payer: COMMERCIAL

## 2023-04-12 NOTE — TELEPHONE ENCOUNTER
----- Message from Van Miller MD sent at 3/20/2023  9:41 AM EDT -----  03/20/23       Tell him that the colon polyps that were removed were not cancerous but were precancerous.  I would recommend a repeat colonoscopy in 3 to 5 years.  Please send a copy of this report to his PCP.  Thx. kjh

## 2023-04-12 NOTE — TELEPHONE ENCOUNTER
Call to pt.  Advise per DR Miller's note.  Verb understanding.     C/s for 3/10/26 placed in recall and HM.     Update to Dr Tom Garza.

## 2023-05-01 DIAGNOSIS — I10 ESSENTIAL HYPERTENSION: ICD-10-CM

## 2023-05-01 DIAGNOSIS — E78.5 DYSLIPIDEMIA: Primary | ICD-10-CM

## 2023-05-01 LAB
ALBUMIN SERPL-MCNC: 4.2 G/DL (ref 3.5–5.2)
ALBUMIN/GLOB SERPL: 2 G/DL
ALP SERPL-CCNC: 63 U/L (ref 39–117)
ALT SERPL-CCNC: 34 U/L (ref 1–41)
AST SERPL-CCNC: 25 U/L (ref 1–40)
BILIRUB SERPL-MCNC: 0.4 MG/DL (ref 0–1.2)
BUN SERPL-MCNC: 18 MG/DL (ref 8–23)
BUN/CREAT SERPL: 15.7 (ref 7–25)
CALCIUM SERPL-MCNC: 9.3 MG/DL (ref 8.6–10.5)
CHLORIDE SERPL-SCNC: 109 MMOL/L (ref 98–107)
CHOLEST SERPL-MCNC: 163 MG/DL (ref 0–200)
CO2 SERPL-SCNC: 27.9 MMOL/L (ref 22–29)
CREAT SERPL-MCNC: 1.15 MG/DL (ref 0.76–1.27)
EGFRCR SERPLBLD CKD-EPI 2021: 71.1 ML/MIN/1.73
GLOBULIN SER CALC-MCNC: 2.1 GM/DL
GLUCOSE SERPL-MCNC: 98 MG/DL (ref 65–99)
HDLC SERPL-MCNC: 38 MG/DL (ref 40–60)
LDLC SERPL CALC-MCNC: 100 MG/DL (ref 0–100)
POTASSIUM SERPL-SCNC: 4.6 MMOL/L (ref 3.5–5.2)
PROT SERPL-MCNC: 6.3 G/DL (ref 6–8.5)
SODIUM SERPL-SCNC: 147 MMOL/L (ref 136–145)
TRIGL SERPL-MCNC: 141 MG/DL (ref 0–150)
VLDLC SERPL CALC-MCNC: 25 MG/DL (ref 5–40)

## 2023-05-08 ENCOUNTER — OFFICE VISIT (OUTPATIENT)
Dept: INTERNAL MEDICINE | Facility: CLINIC | Age: 63
End: 2023-05-08
Payer: COMMERCIAL

## 2023-05-08 VITALS
WEIGHT: 158.5 LBS | HEIGHT: 67 IN | SYSTOLIC BLOOD PRESSURE: 124 MMHG | BODY MASS INDEX: 24.88 KG/M2 | DIASTOLIC BLOOD PRESSURE: 62 MMHG | TEMPERATURE: 98 F | HEART RATE: 83 BPM | OXYGEN SATURATION: 96 %

## 2023-05-08 DIAGNOSIS — J30.89 ENVIRONMENTAL AND SEASONAL ALLERGIES: ICD-10-CM

## 2023-05-08 DIAGNOSIS — I10 ESSENTIAL HYPERTENSION: Primary | ICD-10-CM

## 2023-05-08 DIAGNOSIS — E78.5 DYSLIPIDEMIA: ICD-10-CM

## 2023-05-08 DIAGNOSIS — F41.1 GENERALIZED ANXIETY DISORDER: ICD-10-CM

## 2023-05-08 PROCEDURE — 99214 OFFICE O/P EST MOD 30 MIN: CPT | Performed by: INTERNAL MEDICINE

## 2023-05-08 RX ORDER — DESONIDE 0.5 MG/G
CREAM TOPICAL
COMMUNITY
Start: 2023-04-06

## 2023-05-08 RX ORDER — ESCITALOPRAM OXALATE 10 MG/1
10 TABLET ORAL DAILY
Qty: 30 TABLET | Refills: 5 | Status: SHIPPED | OUTPATIENT
Start: 2023-05-08

## 2023-05-08 NOTE — PROGRESS NOTES
"Hypertension (6 month f/u)      HPI  Keaton Holloway is a 63 y.o. male RTC in f/u: Has been doing well.  Has been playing golf and working around yard in long term and 'enjoying life'.  Only issue has been grandson 'giving me his diseases'.  Had to get Abx from partner for slow to resolve sinus infection. Had GI issues on golf trip thereafter, has not resolved.   Having some mild cough and nasal congestion now that is getting better.     1. Essential hypertension - on one drug. No home log. Has had good numbers at other offices. Home cuff is 'not that acurate' so stopped taking.   2. Dyslipidemia - Low HDL issue in past. Has started doing some exercise.  Doing some jumping jacks and cardio work now, planning to add some 'light weight' activity.   3. CHRIS vs. Adjustment D/O with anxiety - did not tolerate wean to 1/2 dose with noted home stressors in 2021, mood controlled on 10mg escitalopram daily. Ran out of med two weeks ago and has been off. No W/D sx noted.  However, feels like might need to stay off.  \"I am thinking about not taking it'.  Wants refill in case feels like needs to go back on.   4. Environmental Allergies - on immunotherapy for years, retested 9/2022 due to decline in benefit. New formula shots in 11/2022 with rapid uptitration completed.  \"I am at full'.  Off anti-H pill and not taken at all this year.  Pleased with progress, getting one shot/ week.      Review of Systems   Constitutional: Negative for chills, fever, weight gain and weight loss.   Cardiovascular: Negative for chest pain, dyspnea on exertion, irregular heartbeat, leg swelling, near-syncope, palpitations and syncope.   Respiratory: Negative for shortness of breath.    Neurological: Negative for headaches and light-headedness.   Allergic/Immunologic: Positive for environmental allergies. Negative for persistent infections (recurrent, but resolves).       The following portions of the patient's history were reviewed and updated as " "appropriate: allergies, current medications, past medical history, past social history and problem list.      Current Outpatient Medications:   •  azelastine (ASTEPRO) 0.15 % solution nasal spray, 2 sprays into the nostril(s) as directed by provider., Disp: , Rfl:   •  desonide (DESOWEN) 0.05 % cream, APPLY DAILY TO SKIN TO AFFECTED AREA EVERY DAY, Disp: , Rfl:   •  Diclofenac Sodium (VOLTAREN) 1 % gel gel, Apply 4 g topically to the appropriate area as directed 4 (Four) Times a Day., Disp: , Rfl:   •  doxycycline (VIBRAMYCIN) 100 MG capsule, Take 1 capsule by mouth 2 (Two) Times a Day., Disp: 14 capsule, Rfl: 0  •  escitalopram (LEXAPRO) 10 MG tablet, Take 1 tablet by mouth Daily., Disp: 30 tablet, Rfl: 5  •  fluticasone (VERAMYST) 27.5 MCG/SPRAY nasal spray, 2 sprays into the nostril(s) as directed by provider 2 (Two) Times a Day., Disp: , Rfl:   •  losartan (COZAAR) 25 MG tablet, Take 1 tablet by mouth Daily., Disp: 90 tablet, Rfl: 2  •  Saw Palmetto 1000 MG capsule, Take  by mouth., Disp: , Rfl:   •  triamcinolone (KENALOG) 0.1 % cream, Apply  topically to the appropriate area as directed 2 (Two) Times a Day., Disp: 45 g, Rfl: 3    Vitals:    05/08/23 0856   BP: 124/62   BP Location: Left arm   Patient Position: Sitting   Cuff Size: Adult   Pulse: 83   Temp: 98 °F (36.7 °C)   TempSrc: Oral   SpO2: 96%   Weight: 71.9 kg (158 lb 8 oz)   Height: 170.2 cm (67.01\")     Body mass index is 24.82 kg/m².      Physical Exam  Vitals reviewed.   Constitutional:       General: He is not in acute distress.     Appearance: He is well-developed. He is not ill-appearing or toxic-appearing.   HENT:      Head: Normocephalic and atraumatic.      Mouth/Throat:      Mouth: No oral lesions.      Tongue: No lesions.      Pharynx: No pharyngeal swelling or uvula swelling.   Eyes:      General: No scleral icterus.     Conjunctiva/sclera: Conjunctivae normal.      Pupils: Pupils are equal, round, and reactive to light.   Neck:      " Vascular: No carotid bruit.   Cardiovascular:      Rate and Rhythm: Normal rate and regular rhythm.      Pulses:           Carotid pulses are 2+ on the right side and 2+ on the left side.       Radial pulses are 2+ on the right side and 2+ on the left side.      Heart sounds: Normal heart sounds.   Pulmonary:      Effort: Pulmonary effort is normal. No respiratory distress.      Breath sounds: Normal breath sounds. No wheezing, rhonchi or rales.   Musculoskeletal:      Right hand: No swelling, tenderness or bony tenderness. Normal range of motion. Normal strength.      Left hand: Deformity (tiny cyst over flexor tendon on pinky finger) present. No swelling, tenderness or bony tenderness. Normal range of motion. Normal strength.      Cervical back: Normal range of motion and neck supple. No muscular tenderness.      Right lower leg: No edema.      Left lower leg: No edema.   Lymphadenopathy:      Cervical: No cervical adenopathy.   Neurological:      Mental Status: He is alert and oriented to person, place, and time.      Cranial Nerves: No cranial nerve deficit.      Gait: Gait normal.   Psychiatric:         Attention and Perception: Attention normal.         Mood and Affect: Mood and affect normal.         Behavior: Behavior normal.         Thought Content: Thought content normal.         Assessment/ Plan  Diagnoses and all orders for this visit:    Essential hypertension    Generalized anxiety disorder  -     escitalopram (LEXAPRO) 10 MG tablet; Take 1 tablet by mouth Daily.    Environmental and seasonal allergies    Dyslipidemia    Other orders  -     desonide (DESOWEN) 0.05 % cream; APPLY DAILY TO SKIN TO AFFECTED AREA EVERY DAY        Return in about 6 months (around 11/8/2023) for Annual physical.      Discussion:  Keaton Holloway is a 63 y.o. male RTC in f/u:   1. Recurrent infections - thought from young grandson/ .  D/W pt hand, ocular, and nasal toilet hygiene measures for prevention.   2. Essential  hypertension - controlled on one drug.  BMP OK.   3. Dyslipidemia - Low HDL recurs today, but LDL down >20 points. hsCRP low on 10/2021 labs.  Recall, few true CV risks and 10 yr CR calculation prone to overestimate based on male sex and age.  I do not think pt is statin candidate. CV exercise addition advised as he is working on.   4. CHRIS vs. Adjustment D/O with anxiety - did not tolerate wean to 1/2 dose with noted home stressors in 2021, mood controlled on 10mg escitalopram daily. However, self D/C med 2 weeks ago and doing well, prefers to stay off. Refilled in case pt feels like need to restart, but will communicate condition as he trends off meds.   5. Environmental Allergies - on immunotherapy for years, retested 9/2022 due to decline in benefit. New formula shots in 11/2022 with rapid uptitration completed. Now off anti-H pill and maintained on once weekly maintenance shot and daily Astepro and Veramyst.     RTC 6 months CPE, F labs prior    Answers for HPI/ROS submitted by the patient on 5/1/2023  What is the primary reason for your visit?: Other  Please describe your symptoms.: No symptoms., , Follow up visit  Have you had these symptoms before?: No  How long have you been having these symptoms?: 1-4 days  Please list any medications you are currently taking for this condition.: N/A  Please describe any probable cause for these symptoms. : N/A

## 2023-08-21 DIAGNOSIS — F41.1 GENERALIZED ANXIETY DISORDER: ICD-10-CM

## 2023-08-21 RX ORDER — ESCITALOPRAM OXALATE 10 MG/1
10 TABLET ORAL DAILY
Qty: 30 TABLET | Refills: 5 | Status: SHIPPED | OUTPATIENT
Start: 2023-08-21

## 2023-11-01 ENCOUNTER — TELEPHONE (OUTPATIENT)
Dept: INTERNAL MEDICINE | Facility: CLINIC | Age: 63
End: 2023-11-01
Payer: COMMERCIAL

## 2023-11-01 DIAGNOSIS — Z87.438 HX OF ACUTE PROSTATITIS: ICD-10-CM

## 2023-11-01 DIAGNOSIS — I10 ESSENTIAL HYPERTENSION: ICD-10-CM

## 2023-11-01 DIAGNOSIS — Z00.00 HEALTHCARE MAINTENANCE: ICD-10-CM

## 2023-11-01 DIAGNOSIS — E78.5 DYSLIPIDEMIA: Primary | ICD-10-CM

## 2023-11-03 LAB
ALBUMIN SERPL-MCNC: 4.4 G/DL (ref 3.5–5.2)
ALBUMIN/CREAT UR: 5 MG/G CREAT (ref 0–29)
ALBUMIN/GLOB SERPL: 2.2 G/DL
ALP SERPL-CCNC: 59 U/L (ref 39–117)
ALT SERPL-CCNC: 23 U/L (ref 1–41)
APPEARANCE UR: CLEAR
AST SERPL-CCNC: 22 U/L (ref 1–40)
BACTERIA #/AREA URNS HPF: NORMAL /HPF
BASOPHILS # BLD AUTO: 0.02 10*3/MM3 (ref 0–0.2)
BASOPHILS NFR BLD AUTO: 0.4 % (ref 0–1.5)
BILIRUB SERPL-MCNC: 0.4 MG/DL (ref 0–1.2)
BILIRUB UR QL STRIP: NEGATIVE
BUN SERPL-MCNC: 16 MG/DL (ref 8–23)
BUN/CREAT SERPL: 16 (ref 7–25)
CALCIUM SERPL-MCNC: 9.1 MG/DL (ref 8.6–10.5)
CASTS URNS QL MICRO: NORMAL /LPF
CHLORIDE SERPL-SCNC: 110 MMOL/L (ref 98–107)
CHOLEST SERPL-MCNC: 163 MG/DL (ref 0–200)
CHOLEST/HDLC SERPL: 4.66 {RATIO}
CO2 SERPL-SCNC: 27 MMOL/L (ref 22–29)
COLOR UR: YELLOW
CREAT SERPL-MCNC: 1 MG/DL (ref 0.76–1.27)
CREAT UR-MCNC: 190 MG/DL
EGFRCR SERPLBLD CKD-EPI 2021: 84 ML/MIN/1.73
EOSINOPHIL # BLD AUTO: 0.08 10*3/MM3 (ref 0–0.4)
EOSINOPHIL NFR BLD AUTO: 1.7 % (ref 0.3–6.2)
EPI CELLS #/AREA URNS HPF: NORMAL /HPF (ref 0–10)
ERYTHROCYTE [DISTWIDTH] IN BLOOD BY AUTOMATED COUNT: 13 % (ref 12.3–15.4)
GLOBULIN SER CALC-MCNC: 2 GM/DL
GLUCOSE SERPL-MCNC: 98 MG/DL (ref 65–99)
GLUCOSE UR QL STRIP: NEGATIVE
HBA1C MFR BLD: 5.7 % (ref 4.8–5.6)
HCT VFR BLD AUTO: 43.9 % (ref 37.5–51)
HDLC SERPL-MCNC: 35 MG/DL (ref 40–60)
HGB BLD-MCNC: 14.6 G/DL (ref 13–17.7)
HGB UR QL STRIP: NEGATIVE
IMM GRANULOCYTES # BLD AUTO: 0.02 10*3/MM3 (ref 0–0.05)
IMM GRANULOCYTES NFR BLD AUTO: 0.4 % (ref 0–0.5)
KETONES UR QL STRIP: NEGATIVE
LDLC SERPL CALC-MCNC: 106 MG/DL (ref 0–100)
LEUKOCYTE ESTERASE UR QL STRIP: NEGATIVE
LYMPHOCYTES # BLD AUTO: 1.37 10*3/MM3 (ref 0.7–3.1)
LYMPHOCYTES NFR BLD AUTO: 29 % (ref 19.6–45.3)
MCH RBC QN AUTO: 29.1 PG (ref 26.6–33)
MCHC RBC AUTO-ENTMCNC: 33.3 G/DL (ref 31.5–35.7)
MCV RBC AUTO: 87.5 FL (ref 79–97)
MICRO URNS: NORMAL
MICRO URNS: NORMAL
MICROALBUMIN UR-MCNC: 9.3 UG/ML
MONOCYTES # BLD AUTO: 0.46 10*3/MM3 (ref 0.1–0.9)
MONOCYTES NFR BLD AUTO: 9.7 % (ref 5–12)
NEUTROPHILS # BLD AUTO: 2.78 10*3/MM3 (ref 1.7–7)
NEUTROPHILS NFR BLD AUTO: 58.8 % (ref 42.7–76)
NITRITE UR QL STRIP: NEGATIVE
NRBC BLD AUTO-RTO: 0 /100 WBC (ref 0–0.2)
PH UR STRIP: 7 [PH] (ref 5–7.5)
PLATELET # BLD AUTO: 205 10*3/MM3 (ref 140–450)
POTASSIUM SERPL-SCNC: 4.7 MMOL/L (ref 3.5–5.2)
PROT SERPL-MCNC: 6.4 G/DL (ref 6–8.5)
PROT UR QL STRIP: NEGATIVE
PSA SERPL-MCNC: 2.99 NG/ML (ref 0–4)
RBC # BLD AUTO: 5.02 10*6/MM3 (ref 4.14–5.8)
RBC #/AREA URNS HPF: NORMAL /HPF (ref 0–2)
SODIUM SERPL-SCNC: 143 MMOL/L (ref 136–145)
SP GR UR STRIP: 1.02 (ref 1–1.03)
TRIGL SERPL-MCNC: 121 MG/DL (ref 0–150)
TSH SERPL DL<=0.005 MIU/L-ACNC: 1.43 UIU/ML (ref 0.27–4.2)
URINALYSIS REFLEX: NORMAL
UROBILINOGEN UR STRIP-MCNC: 0.2 MG/DL (ref 0.2–1)
VLDLC SERPL CALC-MCNC: 22 MG/DL (ref 5–40)
WBC # BLD AUTO: 4.73 10*3/MM3 (ref 3.4–10.8)
WBC #/AREA URNS HPF: NORMAL /HPF (ref 0–5)

## 2023-11-13 ENCOUNTER — OFFICE VISIT (OUTPATIENT)
Dept: INTERNAL MEDICINE | Facility: CLINIC | Age: 63
End: 2023-11-13
Payer: COMMERCIAL

## 2023-11-13 VITALS
WEIGHT: 153.3 LBS | SYSTOLIC BLOOD PRESSURE: 122 MMHG | HEIGHT: 67 IN | BODY MASS INDEX: 24.06 KG/M2 | HEART RATE: 77 BPM | TEMPERATURE: 97.2 F | OXYGEN SATURATION: 98 % | DIASTOLIC BLOOD PRESSURE: 78 MMHG

## 2023-11-13 DIAGNOSIS — Z00.01 ENCOUNTER FOR GENERAL ADULT MEDICAL EXAMINATION WITH ABNORMAL FINDINGS: Primary | ICD-10-CM

## 2023-11-13 DIAGNOSIS — K57.30 DIVERTICULOSIS OF LARGE INTESTINE WITHOUT HEMORRHAGE: ICD-10-CM

## 2023-11-13 DIAGNOSIS — D12.6 ADENOMATOUS POLYP OF COLON, UNSPECIFIED PART OF COLON: ICD-10-CM

## 2023-11-13 DIAGNOSIS — Z23 NEEDS FLU SHOT: ICD-10-CM

## 2023-11-13 DIAGNOSIS — J30.89 ENVIRONMENTAL AND SEASONAL ALLERGIES: ICD-10-CM

## 2023-11-13 DIAGNOSIS — K40.90 NON-RECURRENT UNILATERAL INGUINAL HERNIA WITHOUT OBSTRUCTION OR GANGRENE: ICD-10-CM

## 2023-11-13 DIAGNOSIS — F41.1 GENERALIZED ANXIETY DISORDER: ICD-10-CM

## 2023-11-13 DIAGNOSIS — E78.5 DYSLIPIDEMIA: ICD-10-CM

## 2023-11-13 DIAGNOSIS — R97.20 INCREASED PROSTATE SPECIFIC ANTIGEN (PSA) VELOCITY: ICD-10-CM

## 2023-11-13 DIAGNOSIS — Z23 NEED FOR COVID-19 VACCINE: ICD-10-CM

## 2023-11-13 DIAGNOSIS — N20.0 NEPHROLITHIASIS: ICD-10-CM

## 2023-11-13 DIAGNOSIS — M70.21 OLECRANON BURSITIS OF RIGHT ELBOW: ICD-10-CM

## 2023-11-13 DIAGNOSIS — I10 ESSENTIAL HYPERTENSION: ICD-10-CM

## 2023-11-13 DIAGNOSIS — Z87.438 HX OF ACUTE PROSTATITIS: ICD-10-CM

## 2023-11-13 DIAGNOSIS — K42.9 UMBILICAL HERNIA WITHOUT OBSTRUCTION AND WITHOUT GANGRENE: ICD-10-CM

## 2023-11-13 PROBLEM — Z86.0100 HISTORY OF COLON POLYPS: Status: RESOLVED | Noted: 2019-08-08 | Resolved: 2023-11-13

## 2023-11-13 PROBLEM — Z86.010 HISTORY OF COLON POLYPS: Status: RESOLVED | Noted: 2019-08-08 | Resolved: 2023-11-13

## 2023-11-13 NOTE — PROGRESS NOTES
"Annual Exam (CPE ) and Joint Swelling (R elbow, at bursa)      HPI  Keaton Holloway is a 63 y.o. male RTC in yearly CPE, review of medical issues:   'I have been doing OK'. Hit R elbow about 10 days ago and had some swelling for last week or so.  Had redness and pain and swelling in R elbow. Has been aching all over body for last few days and had temp up to 99.5 in last few days. Been on Tylenol and NSAID alternating. Today, no issues.  NO URI sx.  Elbow is less swollen, but still TTP today 'a little bit'.  Feeling well today.   Last flare of this burse was 4/2021 and was R elbow at time.  Had gotten this controlled over past few years, so frustrated that this reflared.     1. Essential hypertension -  on one drug. Stopped checking at home. 'It has been pretty good'.   2. Dyslipidemia - noted variable numbers in past.  'I was doing pretty good last winter but gym exercises totally messed up my golf game'.  Laid off the gym execises. Thinking about going back for more cardio, but is really no doing now. Has been on a number of vacations as well, diet is not as good. Will 'eat too much' when on vacation.  Weight drifted up at home, knows need to get back down.   3. CHRIS vs. Adjustment D/O with anxiety - mood is 'pretty good. I do want to stay on the Lexapro'.  Feels controlled. Less stress with group home, back helping part time.   4. Environmental Allergies - on immunotherapy for years, retested 9/2022 due to decline in benefit. New formula shots in 11/2022 with rapid uptitration completed. Is on maintenance once weekly.  Feels like doing well. Is on dual nasal sprays.    5. Hx of BCC - dx'd by derm, removed OPT ~2016. Sees Dr. Nieves annually,  appt 11/2023. No lesions of concern.   6. Inguinal hernia (chronic) and new small, reducible umbilical hernia - watchful waiting. NO growth. NO pain. 'No issues\".      Review of Systems   Constitutional: Positive for chills and weight gain. Negative for fever, night sweats and " weight loss.   HENT:  Negative for congestion, hearing loss, odynophagia and sore throat.    Eyes:  Negative for discharge, double vision, pain, redness, vision loss in left eye and vision loss in right eye.        Last eye exam ~5/2022, planning in 2024 on Medicare     Cardiovascular:  Negative for chest pain, dyspnea on exertion, irregular heartbeat, leg swelling, near-syncope, palpitations and syncope.   Respiratory:  Negative for cough and shortness of breath.    Endocrine: Negative for polydipsia, polyphagia and polyuria.   Hematologic/Lymphatic: Negative for bleeding problem. Does not bruise/bleed easily.   Skin:  Positive for color change (redness at R elbow). Negative for poor wound healing, rash and suspicious lesions.   Musculoskeletal:  Positive for joint swelling (R elbow at bursa). Negative for muscle cramps, muscle weakness and myalgias.   Gastrointestinal:  Positive for constipation ('a little more. THinks was due to ETOH no vacation'.). Negative for diarrhea, dysphagia, heartburn, nausea and vomiting.        Using fiber qodaily in last few weeks.    Genitourinary:  Negative for bladder incontinence, dysuria, frequency, hematuria, hesitancy and incomplete emptying.   Neurological:  Negative for dizziness, headaches and light-headedness.   Psychiatric/Behavioral:  Negative for altered mental status and depression. The patient is not nervous/anxious.    Allergic/Immunologic: Positive for environmental allergies. Negative for persistent infections.       Problem List:    Patient Active Problem List   Diagnosis    Generalized anxiety disorder    Environmental and seasonal allergies    Hx of acute prostatitis    Diverticulosis of large intestine without hemorrhage    Essential hypertension    Umbilical hernia without obstruction and without gangrene    Non-recurrent unilateral inguinal hernia    Nephrolithiasis    Dyslipidemia    Colon polyps       Medical History:    Past Medical History:   Diagnosis Date     Allergic retested September 2022    shots    COVID-19 07/2022    Diverticulosis of large intestine without hemorrhage 08/08/2019    Noted on C-scope 6/2017    Environmental allergies 08/08/2019    On immunotherapy since 1982. Peanut, celery, and apple in raw form cause itchiness in throat; no hx of anaphylaxis.     Generalized anxiety disorder 02/23/2016    Hypertension     Nephrolithiasis 07/12/2021    S/p retrieval in 2020; retained stone noted.     Olecranon bursitis, right elbow 04/2021    seen in ED    Plantar warts     in youth    Prostatitis, acute     2013 and 2014    Shingles     in college        Social History:    Social History     Socioeconomic History    Marital status:    Tobacco Use    Smoking status: Never    Smokeless tobacco: Never   Vaping Use    Vaping Use: Never used   Substance and Sexual Activity    Alcohol use: Yes     Alcohol/week: 2.0 standard drinks of alcohol     Types: 2 Cans of beer per week     Comment: 1-2 drinks/ week    Drug use: No    Sexual activity: Yes     Partners: Female     Comment: wife only; no hx of STD's       Family History:   Family History   Problem Relation Age of Onset    Heart attack Mother     Osteoarthritis Mother     Heart disease Mother     Osteoarthritis Father     Hypertension Father     Arthritis Father     Hypertension Sister     No Known Problems Son     Lung cancer Paternal Grandmother     Cerebral aneurysm Paternal Grandfather         hemorrhaged    Malig Hyperthermia Neg Hx        Surgical History:   Past Surgical History:   Procedure Laterality Date    COLONOSCOPY  11/30/2012    Within normal limits. 3mm hyperplastic polyp; Dr. Miller. TV adenoma in 03/2010    COLONOSCOPY N/A 06/30/2017    Procedure: COLONOSCOPY into cecum and TI;  Surgeon: Van Miller MD;  Location: [facility] ENDOSCOPY;  Service:     COLONOSCOPY N/A 3/10/2023    Procedure: COLONOSCOPY INTO CECUM AND TI WITH POLYPECTOMIES (COLD BX);  Surgeon: Van Miller MD;  Location: [facility]  ENDOSCOPY;  Service: Gastroenterology;  Laterality: N/A;  PRE: HX OF POLYPS  POST: DIVERTICULOSIS, POLYPS, INTERNAL HEMORRHOIDS    ENDOSCOPY  03/2010    Mild Gastritis; Dr. Miller    KIDNEY STONE SURGERY      MOLE REMOVAL      benign         Current Outpatient Medications:     azelastine (ASTEPRO) 0.15 % solution nasal spray, 2 sprays into the nostril(s) as directed by provider., Disp: , Rfl:     desonide (DESOWEN) 0.05 % cream, APPLY DAILY TO SKIN TO AFFECTED AREA EVERY DAY, Disp: , Rfl:     Diclofenac Sodium (VOLTAREN) 1 % gel gel, Apply 4 g topically to the appropriate area as directed 4 (Four) Times a Day., Disp: , Rfl:     escitalopram (LEXAPRO) 10 MG tablet, Take 1 tablet by mouth Daily., Disp: 30 tablet, Rfl: 5    fluticasone (VERAMYST) 27.5 MCG/SPRAY nasal spray, 2 sprays into the nostril(s) as directed by provider 2 (Two) Times a Day., Disp: , Rfl:     losartan (COZAAR) 25 MG tablet, Take 1 tablet by mouth Daily., Disp: 90 tablet, Rfl: 2    Saw Palmetto 1000 MG capsule, Take  by mouth., Disp: , Rfl:     triamcinolone (KENALOG) 0.1 % cream, Apply  topically to the appropriate area as directed 2 (Two) Times a Day., Disp: 45 g, Rfl: 3    doxycycline (VIBRAMYCIN) 100 MG capsule, Take 1 capsule by mouth 2 (Two) Times a Day., Disp: 14 capsule, Rfl: 0    Vitals:    11/13/23 1112   BP: 122/78   Pulse: 77   Temp: 97.2 °F (36.2 °C)   SpO2: 98%     Body mass index is 24 kg/m².    Physical Exam  Vitals reviewed.   Constitutional:       General: He is not in acute distress.     Appearance: Normal appearance. He is well-developed. He is not ill-appearing or toxic-appearing.   HENT:      Head: Normocephalic and atraumatic.      Right Ear: Hearing, tympanic membrane, ear canal and external ear normal. There is no impacted cerumen.      Left Ear: Hearing, tympanic membrane, ear canal and external ear normal. There is no impacted cerumen.      Nose: Nose normal.      Mouth/Throat:      Mouth: Mucous membranes are moist. No  oral lesions.      Tongue: No lesions.      Pharynx: Oropharynx is clear. Uvula midline. No pharyngeal swelling, oropharyngeal exudate, posterior oropharyngeal erythema or uvula swelling.   Eyes:      General: Lids are normal. No scleral icterus.        Right eye: No discharge.         Left eye: No discharge.      Extraocular Movements: Extraocular movements intact.      Conjunctiva/sclera: Conjunctivae normal.      Pupils: Pupils are equal, round, and reactive to light.   Neck:      Thyroid: No thyroid mass or thyromegaly.      Vascular: No carotid bruit.   Cardiovascular:      Rate and Rhythm: Normal rate and regular rhythm.      Pulses:           Radial pulses are 2+ on the right side and 2+ on the left side.        Dorsalis pedis pulses are 2+ on the right side and 2+ on the left side.        Posterior tibial pulses are 2+ on the right side and 2+ on the left side.      Heart sounds: Normal heart sounds, S1 normal and S2 normal. No murmur heard.     No friction rub. No gallop.   Pulmonary:      Effort: Pulmonary effort is normal. No respiratory distress.      Breath sounds: Normal breath sounds. No wheezing, rhonchi or rales.   Abdominal:      General: Bowel sounds are normal. There is no distension.      Palpations: Abdomen is soft. There is no mass.      Tenderness: There is no abdominal tenderness. There is no guarding or rebound.   Genitourinary:     Prostate: Normal. Enlarged (mild, diffuse, symmetric). Not tender and no nodules present.      Rectum: Normal. External hemorrhoid present. No tenderness. Normal anal tone.   Musculoskeletal:         General: No deformity. Normal range of motion.      Right shoulder: No tenderness, bony tenderness or crepitus. Normal range of motion.      Left shoulder: No tenderness, bony tenderness or crepitus. Normal range of motion.      Right elbow: Swelling (at olecranon bursa, see photo) present. No lacerations (small scab noted at burse). Normal range of motion.  Tenderness present in olecranon process (mild only).      Left elbow: No deformity or lacerations. Normal range of motion.      Right hand: No tenderness or bony tenderness. Normal range of motion. Normal strength.      Left hand: No tenderness or bony tenderness. Normal range of motion. Normal strength.      Cervical back: Full passive range of motion without pain, normal range of motion and neck supple.      Right lower leg: No edema.      Left lower leg: No edema.   Lymphadenopathy:      Cervical: No cervical adenopathy.      Right cervical: No superficial, deep or posterior cervical adenopathy.     Left cervical: No superficial, deep or posterior cervical adenopathy.      Upper Body:      Right upper body: No supraclavicular, axillary or pectoral adenopathy.      Left upper body: No supraclavicular, axillary or pectoral adenopathy.   Skin:     General: Skin is warm and dry.      Findings: No rash.   Neurological:      Mental Status: He is alert and oriented to person, place, and time.      Cranial Nerves: No cranial nerve deficit.      Sensory: No sensory deficit.      Motor: No weakness, tremor, atrophy or abnormal muscle tone.      Gait: Gait normal.      Deep Tendon Reflexes: Reflexes are normal and symmetric.      Reflex Scores:       Patellar reflexes are 2+ on the right side and 2+ on the left side.       Achilles reflexes are 2+ on the right side and 2+ on the left side.  Psychiatric:         Attention and Perception: Attention normal.         Mood and Affect: Mood normal.         Speech: Speech normal.         Behavior: Behavior normal. Behavior is cooperative.         Thought Content: Thought content normal.         Assessment/ Plan  Diagnoses and all orders for this visit:    Encounter for general adult medical examination with abnormal findings    Essential hypertension    Dyslipidemia    Environmental and seasonal allergies    Generalized anxiety disorder    Hx of acute  prostatitis    Nephrolithiasis    Non-recurrent unilateral inguinal hernia without obstruction or gangrene    Umbilical hernia without obstruction and without gangrene    Diverticulosis of large intestine without hemorrhage    Adenomatous polyp of colon, unspecified part of colon    Needs flu shot  -     Fluzone (or Fluarix & Flulaval for VFC) >6mos    Need for COVID-19 vaccine  -     COVID-19 F23 (Pfizer) 12yrs+ (COMIRNATY)    Increased prostate specific antigen (PSA) velocity  -     PSA, Total & Free    Olecranon bursitis of right elbow        Return in about 6 months (around 5/13/2024) for Recheck.      Discussion:  Keaton Holloway is a 63 y.o. male RTC in yearly CPE, review of medical issues:   1. R olecranon bursitis - new issue today, hx of recurrent (last event was ~4/2021). Current event improving per pt, noting decrease in swelling/ pain.  Exam notable for circumferential desquamation over busra suggestive of resolving inflammation.  I am reassured that this was likely traumatic, but pt hx of temp to 99.5 in last few days and feeling achy of note (? Viral infection unrelated vs. Inflammation at bursa).  Watchful waiting for now, no Abx advised today. OK for PRN Voltaren gel and compression as is. Pt to call or RTC if T> 100.5, recurrent swelling/ progressive pain, or failure to improve.    2. Essential hypertension - controlled on one drug. BMP OK.   3. Dyslipidemia - Low HDL persistent today, but LDL remains down >20 points from past high. hsCRP low on 10/2021 labs.  Recall, few true CV risks and 10 yr CR calculation prone to overestimate based on male sex and age.  Will not advise statin at this time, but will continue to address, ? CCscore, if not able to get HDL up over time. Trend lipids at f/u.  4. CHRIS vs. Adjustment D/O with anxiety; did not tolerate wean to 1/2 dose with noted home stressors in 2021 - mood controlled on 10mg escitalopram daily.   5. Environmental Allergies; on immunotherapy for  years, retested 9/2022 due to decline in benefit. New formula shots in 11/2022 with rapid uptitration completed - doing well on maintenance once weekly and dual nasal sprays, Astepro and Veramyst. F/U allergy as planned.  6. Hx of Prostatitis infection x 2 since 2013 - CARIDAD.   --> PSA velocity elevation - noted on labs today. Exam not suggestive of prostatitis today.  Recheck with no ejaculation 48 hours prior to check.   7. Nephrolithiasis - recurrent in 2020 s/p retrieval; persists with one retained stone. Saw urology 10/2022, stable XR with retained stone per note reviewed today. Monitor for sx per Dr. Mancilla in urology.   8. Hx of BCC - dx'd by derm, removed OPT ~2016. Sees Dr. Nieves annually, appt 11/2023 planned. Exam benign today.   9. Inguinal hernia (chronic) and small, reducible umbilical hernia - watchful waiting. Pt aware of s/s incarcerated hernia and to ED if occurs   10. HM - labs d/w pt; Flu/ COVID booster - today; Tdap/ Hep A/ Shingrix/ COVID - UTD; RSV vacccine reviewed with pt, d/w pt rationale and risk vs. benefit, reviewed trial data including GB syndrome and Afib incidence, pt defers for now; C-scope 6/2017 (-) --> 3/2023 (3 TA) --> 3 years; GEOVANY OK, PSA as above;  Hep C Ab (-) 8/2019; add more CV exercise.     RTC 6 months, F labs prior (CMP, A1C, FLP,PSA)

## 2023-11-14 LAB
PSA FREE MFR SERPL: 41.5 %
PSA FREE SERPL-MCNC: 1.12 NG/ML
PSA SERPL-MCNC: 2.7 NG/ML (ref 0–4)

## 2023-11-15 ENCOUNTER — TELEPHONE (OUTPATIENT)
Dept: INTERNAL MEDICINE | Facility: CLINIC | Age: 63
End: 2023-11-15
Payer: COMMERCIAL

## 2023-11-15 NOTE — TELEPHONE ENCOUNTER
Pts wife informed    ----- Message from Tom Garza MD sent at 11/14/2023  5:24 PM EST -----  Results called to pt 11/15/23  Repeat PSA down to 2.7, placing patient right on the borderline for an accelerated velocity of PSA.  Good news the percent free PSA has a low cancer risk profile.  As such, would recommend checking PSA in 6 months after a 48-hour period of abstinence, as discussed at visit.

## 2023-12-19 RX ORDER — LOSARTAN POTASSIUM 25 MG/1
25 TABLET ORAL DAILY
Qty: 90 TABLET | Refills: 2 | Status: SHIPPED | OUTPATIENT
Start: 2023-12-19

## 2024-01-17 DIAGNOSIS — F41.1 GENERALIZED ANXIETY DISORDER: ICD-10-CM

## 2024-01-17 RX ORDER — ESCITALOPRAM OXALATE 10 MG/1
10 TABLET ORAL DAILY
Qty: 30 TABLET | Refills: 5 | Status: SHIPPED | OUTPATIENT
Start: 2024-01-17

## 2024-01-17 NOTE — TELEPHONE ENCOUNTER
"Caller: Keaton Holloway \"Kt\"    Relationship: Self    Best call back number: [phone]    Requested Prescriptions:   Requested Prescriptions     Pending Prescriptions Disp Refills    escitalopram (LEXAPRO) 10 MG tablet 30 tablet 5     Sig: Take 1 tablet by mouth Daily.        Pharmacy where request should be sent: [facility]/PHARMACY #[id number] [place], [place] - [address] . - [phone]  - [phone] FX     Last office visit with prescribing clinician: 11/13/2023   Last telemedicine visit with prescribing clinician: Visit date not found   Next office visit with prescribing clinician: 5/16/2024     Additional details provided by patient: N/A     Does the patient have less than a 3 day supply:  [x] Yes  [] No    Would you like a call back once the refill request has been completed: [] Yes [x] No    If the office needs to give you a call back, can they leave a voicemail: [x] Yes [] No    Juliette Rowland Rep   01/17/24 14:00 EST     "

## 2024-05-07 DIAGNOSIS — F41.1 GENERALIZED ANXIETY DISORDER: ICD-10-CM

## 2024-05-07 RX ORDER — ESCITALOPRAM OXALATE 10 MG/1
10 TABLET ORAL DAILY
Qty: 90 TABLET | Refills: 1 | Status: SHIPPED | OUTPATIENT
Start: 2024-05-07

## 2024-05-31 DIAGNOSIS — R73.01 IFG (IMPAIRED FASTING GLUCOSE): ICD-10-CM

## 2024-05-31 DIAGNOSIS — I10 ESSENTIAL HYPERTENSION: ICD-10-CM

## 2024-05-31 DIAGNOSIS — E78.5 DYSLIPIDEMIA: ICD-10-CM

## 2024-05-31 DIAGNOSIS — Z12.5 SCREENING FOR PROSTATE CANCER: Primary | ICD-10-CM

## 2024-06-13 LAB
ALBUMIN SERPL-MCNC: 4.5 G/DL (ref 3.5–5.2)
ALBUMIN/GLOB SERPL: 2.1 G/DL
ALP SERPL-CCNC: 57 U/L (ref 39–117)
ALT SERPL-CCNC: 22 U/L (ref 1–41)
AST SERPL-CCNC: 20 U/L (ref 1–40)
BILIRUB SERPL-MCNC: 0.6 MG/DL (ref 0–1.2)
BUN SERPL-MCNC: 19 MG/DL (ref 8–23)
BUN/CREAT SERPL: 15.2 (ref 7–25)
CALCIUM SERPL-MCNC: 9.2 MG/DL (ref 8.6–10.5)
CHLORIDE SERPL-SCNC: 109 MMOL/L (ref 98–107)
CHOLEST SERPL-MCNC: 192 MG/DL (ref 0–200)
CO2 SERPL-SCNC: 25.9 MMOL/L (ref 22–29)
CREAT SERPL-MCNC: 1.25 MG/DL (ref 0.76–1.27)
EGFRCR SERPLBLD CKD-EPI 2021: 63.9 ML/MIN/1.73
GLOBULIN SER CALC-MCNC: 2.1 GM/DL
GLUCOSE SERPL-MCNC: 91 MG/DL (ref 65–99)
HBA1C MFR BLD: 5.5 % (ref 4.8–5.6)
HDLC SERPL-MCNC: 40 MG/DL (ref 40–60)
LDLC SERPL CALC-MCNC: 130 MG/DL (ref 0–100)
LDLC/HDLC SERPL: 3.2 {RATIO}
POTASSIUM SERPL-SCNC: 4.9 MMOL/L (ref 3.5–5.2)
PROT SERPL-MCNC: 6.6 G/DL (ref 6–8.5)
PSA SERPL-MCNC: 2.08 NG/ML (ref 0–4)
SODIUM SERPL-SCNC: 142 MMOL/L (ref 136–145)
TRIGL SERPL-MCNC: 120 MG/DL (ref 0–150)
VLDLC SERPL CALC-MCNC: 22 MG/DL (ref 5–40)

## 2024-06-21 ENCOUNTER — OFFICE VISIT (OUTPATIENT)
Dept: INTERNAL MEDICINE | Facility: CLINIC | Age: 64
End: 2024-06-21
Payer: MEDICARE

## 2024-06-21 VITALS
TEMPERATURE: 97.9 F | OXYGEN SATURATION: 95 % | SYSTOLIC BLOOD PRESSURE: 106 MMHG | HEART RATE: 77 BPM | DIASTOLIC BLOOD PRESSURE: 74 MMHG | WEIGHT: 157.8 LBS | HEIGHT: 67 IN | BODY MASS INDEX: 24.77 KG/M2

## 2024-06-21 DIAGNOSIS — J30.89 ENVIRONMENTAL AND SEASONAL ALLERGIES: ICD-10-CM

## 2024-06-21 DIAGNOSIS — E78.5 DYSLIPIDEMIA: ICD-10-CM

## 2024-06-21 DIAGNOSIS — R12 HEARTBURN: ICD-10-CM

## 2024-06-21 DIAGNOSIS — F41.1 GENERALIZED ANXIETY DISORDER: ICD-10-CM

## 2024-06-21 DIAGNOSIS — I10 ESSENTIAL HYPERTENSION: Primary | ICD-10-CM

## 2024-06-21 PROCEDURE — 3074F SYST BP LT 130 MM HG: CPT | Performed by: INTERNAL MEDICINE

## 2024-06-21 PROCEDURE — 1126F AMNT PAIN NOTED NONE PRSNT: CPT | Performed by: INTERNAL MEDICINE

## 2024-06-21 PROCEDURE — 99214 OFFICE O/P EST MOD 30 MIN: CPT | Performed by: INTERNAL MEDICINE

## 2024-06-21 PROCEDURE — 3078F DIAST BP <80 MM HG: CPT | Performed by: INTERNAL MEDICINE

## 2024-06-21 PROCEDURE — 1159F MED LIST DOCD IN RCRD: CPT | Performed by: INTERNAL MEDICINE

## 2024-06-21 PROCEDURE — 1160F RVW MEDS BY RX/DR IN RCRD: CPT | Performed by: INTERNAL MEDICINE

## 2024-06-21 RX ORDER — FAMOTIDINE 20 MG/1
20 TABLET, FILM COATED ORAL 2 TIMES DAILY PRN
Start: 2024-06-21

## 2024-06-21 NOTE — PROGRESS NOTES
Hypertension, Dyslipidemia, and Heartburn      HPI  Keaton Holloway is a 64 y.o. male RTC in f/u:   Has been doing really well.  Busy working on addition to [employer].  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   1. Essential hypertension - on one drug. Not been checking at home recently.  Low pressure initially in office low and notes 'never that low'. No low pressure sx noted (dizziness or unusual fatigue).  Has been out in heat today.   2. Dyslipidemia - Low HDL persistent last labs.  Feels like diet is better with less sweets.  Is active but thinks could use some more cardio. Really busy around house with ADL's.    3. CHRIS vs. Adjustment D/O with anxiety; did not tolerate wean to 1/2 dose with noted home stressors in 2021 - mood controlled on 10mg escitalopram daily.  Mood has had held on current dose.   4. Environmental Allergies; on immunotherapy for years, retested 9/2022 due to decline in benefit. New formula shots in 11/2022 with rapid uptitration completed - doing well on maintenance once weekly and daily nasal rinse and Veramyst. Only dose Astepro as needed.   5. PSA velocity elevation - noted on last CPE labs and had reassuring f/u labs.  Had recheck with this lab set.     Answers submitted by the patient for this visit:  Primary Reason for Visit (Submitted on 6/21/2024)  What is the primary reason for your visit?: Anxiety  Anxiety (Submitted on 6/21/2024)  Chief Complaint: Anxiety  Visit: follow-up  Frequency: most days  Severity: mild  confusion: No  depressed mood: No  dry mouth: No  excessive worry: No  irritability: Yes  obsessions: No  Sleep quality: fair  Hours of sleep per night: 7 Hours  Aggravated by: family issues  Medication compliance: %    Review of Systems   Constitutional: Negative for malaise/fatigue.   Cardiovascular:  Negative for chest pain, dyspnea on exertion and palpitations.   Respiratory:  Negative for shortness of breath.    Gastrointestinal:  Positive for heartburn  "(Event several weeks ago and took 14 days of OTC Prilosec with resolved symptoms.  Mild recurrence in last few days, intermittent.). Negative for abdominal pain and nausea.   Neurological:  Negative for dizziness and light-headedness.   Psychiatric/Behavioral:  Negative for altered mental status. The patient does not have insomnia and is not nervous/anxious.        The following portions of the patient's history were reviewed and updated as appropriate: allergies, current medications, past medical history, past social history, and problem list.      Current Outpatient Medications:     desonide (DESOWEN) 0.05 % cream, APPLY DAILY TO SKIN TO AFFECTED AREA EVERY DAY, Disp: , Rfl:     Diclofenac Sodium (VOLTAREN) 1 % gel gel, Apply 4 g topically to the appropriate area as directed 4 (Four) Times a Day., Disp: , Rfl:     escitalopram (LEXAPRO) 10 MG tablet, Take 1 tablet by mouth Daily., Disp: 90 tablet, Rfl: 1    fluticasone (VERAMYST) 27.5 MCG/SPRAY nasal spray, 2 sprays into the nostril(s) as directed by provider 2 (Two) Times a Day., Disp: , Rfl:     losartan (COZAAR) 25 MG tablet, TAKE 1 TABLET BY MOUTH EVERY DAY, Disp: 90 tablet, Rfl: 2    Saw Palmetto 1000 MG capsule, Take  by mouth., Disp: , Rfl:     triamcinolone (KENALOG) 0.1 % cream, Apply  topically to the appropriate area as directed 2 (Two) Times a Day., Disp: 45 g, Rfl: 3    azelastine (ASTEPRO) 0.15 % solution nasal spray, 2 sprays into the nostril(s) as directed by provider., Disp: , Rfl:     famotidine (Pepcid) 20 MG tablet, Take 1 tablet by mouth 2 (Two) Times a Day As Needed for Heartburn., Disp: , Rfl:     Vitals:    06/21/24 1435 06/21/24 1439   BP: 92/56 106/74   BP Location: Left arm Left arm   Patient Position: Sitting Sitting   Cuff Size: Adult Adult   Pulse: 77    Temp: 97.9 °F (36.6 °C)    TempSrc: Infrared    SpO2: 95%    Weight: 71.6 kg (157 lb 12.8 oz)    Height: 170.2 cm (67.01\")      Body mass index is 24.71 kg/m².      Physical " Exam  Vitals reviewed.   Constitutional:       General: He is not in acute distress.     Appearance: He is well-developed. He is not ill-appearing or toxic-appearing.   HENT:      Head: Normocephalic and atraumatic.      Mouth/Throat:      Mouth: No oral lesions.      Tongue: No lesions.      Pharynx: No pharyngeal swelling or uvula swelling.   Eyes:      General: No scleral icterus.     Conjunctiva/sclera: Conjunctivae normal.      Pupils: Pupils are equal, round, and reactive to light.   Neck:      Vascular: No carotid bruit.   Cardiovascular:      Rate and Rhythm: Normal rate and regular rhythm.      Pulses:           Carotid pulses are 2+ on the right side and 2+ on the left side.       Radial pulses are 2+ on the right side and 2+ on the left side.      Heart sounds: Normal heart sounds.   Pulmonary:      Effort: Pulmonary effort is normal. No respiratory distress.      Breath sounds: Normal breath sounds. No wheezing, rhonchi or rales.   Musculoskeletal:      Cervical back: Normal range of motion and neck supple. No muscular tenderness.      Right lower leg: No edema.      Left lower leg: No edema.   Neurological:      Mental Status: He is alert and oriented to person, place, and time.      Cranial Nerves: No cranial nerve deficit.      Gait: Gait normal.   Psychiatric:         Attention and Perception: Attention normal.         Mood and Affect: Mood and affect normal.         Behavior: Behavior normal.         Thought Content: Thought content normal.         Assessment/ Plan  Diagnoses and all orders for this visit:    Essential hypertension    Generalized anxiety disorder    Environmental and seasonal allergies    Heartburn  -     famotidine (Pepcid) 20 MG tablet; Take 1 tablet by mouth 2 (Two) Times a Day As Needed for Heartburn.    Dyslipidemia        Return in about 6 months (around 12/21/2024) for Medicare Wellness.      Discussion:  Keaton Holloway is a 64 y.o. male RTC in f/u:    1. Essential  hypertension - controlled on one drug.  On lower end of normal range today, but patient s/p work in the heat and golf around today.  C/W current medications.  BMP OK.   2. Dyslipidemia, hsCRP low on 10/2021 labs - HDL improved to 40 on labs, patient quite active and notes improved diet (daughter no longer working at [employer]).  C/W TLC mods  3. CHRIS vs. Adjustment D/O with anxiety; did not tolerate wean to 1/2 dose with noted home stressors in 2021 - mood controlled on 10mg escitalopram daily.    4. Environmental Allergies; on immunotherapy for years, retested 9/2022 due to decline in benefit. New formula shots in 11/2022 with rapid uptitration completed -well cotnrolled on maintenance once weekly and daily nasal rinse and Veramyst.  Using nasal antihistamine PRN only.  5. Hx of Prostatitis infection x 2 since 2013 - CARIDAD.   --> PSA velocity elevation - noted on last CPE labs and had reassuring f/u labs. PSA remains reduced back to baseline at this time.   6.  Heartburn-recent occurrence, responded to OTC PPI 14-day course.  Mild intermittent recurrence of events in last week.  Advised Pepcid as needed OTC. Trend.     RTC 6 months AWV, F labs prior

## 2024-09-25 RX ORDER — LOSARTAN POTASSIUM 25 MG/1
25 TABLET ORAL DAILY
Qty: 90 TABLET | Refills: 2 | Status: SHIPPED | OUTPATIENT
Start: 2024-09-25

## 2024-11-06 NOTE — TELEPHONE ENCOUNTER
Please move up this pt's appt to be in December sometime between 12/18 and 12/31. I would like to see her before the end of the year to recheck diabetes with a1c, etc.   Pt called to reschedule labs- pts labs were originally scheduled for next week but pt will be coming in tomorrow for his CPE labs and those labs just need to be put in for him please.

## 2024-11-13 DIAGNOSIS — F41.1 GENERALIZED ANXIETY DISORDER: ICD-10-CM

## 2024-11-13 RX ORDER — ESCITALOPRAM OXALATE 10 MG/1
10 TABLET ORAL DAILY
Qty: 90 TABLET | Refills: 1 | Status: SHIPPED | OUTPATIENT
Start: 2024-11-13

## 2025-01-03 ENCOUNTER — PATIENT MESSAGE (OUTPATIENT)
Dept: INTERNAL MEDICINE | Facility: CLINIC | Age: 65
End: 2025-01-03
Payer: MEDICARE

## 2025-01-14 DIAGNOSIS — R73.01 IFG (IMPAIRED FASTING GLUCOSE): ICD-10-CM

## 2025-01-14 DIAGNOSIS — E78.5 DYSLIPIDEMIA: ICD-10-CM

## 2025-01-14 DIAGNOSIS — Z00.00 ENCOUNTER FOR ANNUAL WELLNESS VISIT (AWV) IN MEDICARE PATIENT: ICD-10-CM

## 2025-01-14 DIAGNOSIS — Z87.438 HX OF ACUTE PROSTATITIS: ICD-10-CM

## 2025-01-14 DIAGNOSIS — Z13.29 SCREENING FOR THYROID DISORDER: Primary | ICD-10-CM

## 2025-01-14 DIAGNOSIS — Z12.5 ENCOUNTER FOR SCREENING FOR MALIGNANT NEOPLASM OF PROSTATE: ICD-10-CM

## 2025-01-14 DIAGNOSIS — I10 ESSENTIAL HYPERTENSION: ICD-10-CM

## 2025-01-16 LAB
ALBUMIN SERPL-MCNC: 4.3 G/DL (ref 3.5–5.2)
ALBUMIN/GLOB SERPL: 2 G/DL
ALP SERPL-CCNC: 59 U/L (ref 39–117)
ALT SERPL-CCNC: 23 U/L (ref 1–41)
APPEARANCE UR: CLEAR
AST SERPL-CCNC: 18 U/L (ref 1–40)
BACTERIA #/AREA URNS HPF: NORMAL /[HPF]
BASOPHILS # BLD AUTO: 0.02 10*3/MM3 (ref 0–0.2)
BASOPHILS NFR BLD AUTO: 0.4 % (ref 0–1.5)
BILIRUB SERPL-MCNC: 0.6 MG/DL (ref 0–1.2)
BILIRUB UR QL STRIP: NEGATIVE
BUN SERPL-MCNC: 21 MG/DL (ref 8–23)
BUN/CREAT SERPL: 18.1 (ref 7–25)
CALCIUM SERPL-MCNC: 9.6 MG/DL (ref 8.6–10.5)
CASTS URNS QL MICRO: NORMAL /LPF
CHLORIDE SERPL-SCNC: 105 MMOL/L (ref 98–107)
CHOLEST SERPL-MCNC: 186 MG/DL (ref 0–200)
CO2 SERPL-SCNC: 28 MMOL/L (ref 22–29)
COLOR UR: YELLOW
CREAT SERPL-MCNC: 1.16 MG/DL (ref 0.76–1.27)
EGFRCR SERPLBLD CKD-EPI 2021: 69.9 ML/MIN/1.73
EOSINOPHIL # BLD AUTO: 0.1 10*3/MM3 (ref 0–0.4)
EOSINOPHIL NFR BLD AUTO: 1.9 % (ref 0.3–6.2)
EPI CELLS #/AREA URNS HPF: NORMAL /HPF (ref 0–10)
ERYTHROCYTE [DISTWIDTH] IN BLOOD BY AUTOMATED COUNT: 12.3 % (ref 12.3–15.4)
GLOBULIN SER CALC-MCNC: 2.1 GM/DL
GLUCOSE SERPL-MCNC: 103 MG/DL (ref 65–99)
GLUCOSE UR QL STRIP: NEGATIVE
HBA1C MFR BLD: 5.5 % (ref 4.8–5.6)
HCT VFR BLD AUTO: 44.7 % (ref 37.5–51)
HDLC SERPL-MCNC: 35 MG/DL (ref 40–60)
HGB BLD-MCNC: 14.7 G/DL (ref 13–17.7)
HGB UR QL STRIP: NEGATIVE
IMM GRANULOCYTES # BLD AUTO: 0.05 10*3/MM3 (ref 0–0.05)
IMM GRANULOCYTES NFR BLD AUTO: 0.9 % (ref 0–0.5)
KETONES UR QL STRIP: NEGATIVE
LDLC SERPL CALC-MCNC: 131 MG/DL (ref 0–100)
LEUKOCYTE ESTERASE UR QL STRIP: NEGATIVE
LYMPHOCYTES # BLD AUTO: 1.46 10*3/MM3 (ref 0.7–3.1)
LYMPHOCYTES NFR BLD AUTO: 27.4 % (ref 19.6–45.3)
MCH RBC QN AUTO: 28.8 PG (ref 26.6–33)
MCHC RBC AUTO-ENTMCNC: 32.9 G/DL (ref 31.5–35.7)
MCV RBC AUTO: 87.6 FL (ref 79–97)
MICRO URNS: NORMAL
MICRO URNS: NORMAL
MONOCYTES # BLD AUTO: 0.44 10*3/MM3 (ref 0.1–0.9)
MONOCYTES NFR BLD AUTO: 8.3 % (ref 5–12)
NEUTROPHILS # BLD AUTO: 3.25 10*3/MM3 (ref 1.7–7)
NEUTROPHILS NFR BLD AUTO: 61.1 % (ref 42.7–76)
NITRITE UR QL STRIP: NEGATIVE
NRBC BLD AUTO-RTO: 0 /100 WBC (ref 0–0.2)
PH UR STRIP: 7.5 [PH] (ref 5–7.5)
PLATELET # BLD AUTO: 217 10*3/MM3 (ref 140–450)
POTASSIUM SERPL-SCNC: 4.9 MMOL/L (ref 3.5–5.2)
PROT SERPL-MCNC: 6.4 G/DL (ref 6–8.5)
PROT UR QL STRIP: NEGATIVE
PSA SERPL-MCNC: 2.75 NG/ML (ref 0–4)
RBC # BLD AUTO: 5.1 10*6/MM3 (ref 4.14–5.8)
RBC #/AREA URNS HPF: NORMAL /HPF (ref 0–2)
SODIUM SERPL-SCNC: 142 MMOL/L (ref 136–145)
SP GR UR STRIP: 1.02 (ref 1–1.03)
TRIGL SERPL-MCNC: 112 MG/DL (ref 0–150)
TSH SERPL DL<=0.005 MIU/L-ACNC: 1.16 UIU/ML (ref 0.27–4.2)
URINALYSIS REFLEX: NORMAL
UROBILINOGEN UR STRIP-MCNC: 0.2 MG/DL (ref 0.2–1)
VLDLC SERPL CALC-MCNC: 20 MG/DL (ref 5–40)
WBC # BLD AUTO: 5.32 10*3/MM3 (ref 3.4–10.8)
WBC #/AREA URNS HPF: NORMAL /HPF (ref 0–5)

## 2025-01-21 ENCOUNTER — OFFICE VISIT (OUTPATIENT)
Dept: INTERNAL MEDICINE | Facility: CLINIC | Age: 65
End: 2025-01-21
Payer: MEDICARE

## 2025-01-21 VITALS
HEIGHT: 67 IN | TEMPERATURE: 97.5 F | WEIGHT: 159 LBS | SYSTOLIC BLOOD PRESSURE: 142 MMHG | BODY MASS INDEX: 24.96 KG/M2 | OXYGEN SATURATION: 98 % | DIASTOLIC BLOOD PRESSURE: 90 MMHG | HEART RATE: 63 BPM

## 2025-01-21 DIAGNOSIS — D12.6 ADENOMATOUS POLYP OF COLON, UNSPECIFIED PART OF COLON: ICD-10-CM

## 2025-01-21 DIAGNOSIS — H90.3 SENSORINEURAL HEARING LOSS (SNHL) OF BOTH EARS: ICD-10-CM

## 2025-01-21 DIAGNOSIS — Z00.01 ENCOUNTER FOR GENERAL ADULT MEDICAL EXAMINATION WITH ABNORMAL FINDINGS: Primary | ICD-10-CM

## 2025-01-21 DIAGNOSIS — K40.90 NON-RECURRENT UNILATERAL INGUINAL HERNIA WITHOUT OBSTRUCTION OR GANGRENE: ICD-10-CM

## 2025-01-21 DIAGNOSIS — L21.9 SEBORRHEIC DERMATITIS: ICD-10-CM

## 2025-01-21 DIAGNOSIS — E78.5 DYSLIPIDEMIA: ICD-10-CM

## 2025-01-21 DIAGNOSIS — F41.1 GENERALIZED ANXIETY DISORDER: ICD-10-CM

## 2025-01-21 DIAGNOSIS — I10 UNCONTROLLED HYPERTENSION: ICD-10-CM

## 2025-01-21 DIAGNOSIS — Z87.438 HX OF ACUTE PROSTATITIS: ICD-10-CM

## 2025-01-21 DIAGNOSIS — J30.89 ENVIRONMENTAL AND SEASONAL ALLERGIES: ICD-10-CM

## 2025-01-21 DIAGNOSIS — K57.30 DIVERTICULOSIS OF LARGE INTESTINE WITHOUT HEMORRHAGE: ICD-10-CM

## 2025-01-21 DIAGNOSIS — K42.9 UMBILICAL HERNIA WITHOUT OBSTRUCTION AND WITHOUT GANGRENE: ICD-10-CM

## 2025-01-21 DIAGNOSIS — N20.0 NEPHROLITHIASIS: ICD-10-CM

## 2025-01-21 DIAGNOSIS — Z23 NEED FOR COVID-19 VACCINE: ICD-10-CM

## 2025-01-21 DIAGNOSIS — I10 ESSENTIAL HYPERTENSION: ICD-10-CM

## 2025-01-21 DIAGNOSIS — Z23 NEED FOR PNEUMOCOCCAL 20-VALENT CONJUGATE VACCINATION: ICD-10-CM

## 2025-01-21 RX ORDER — MELOXICAM 15 MG/1
15 TABLET ORAL DAILY
COMMUNITY
End: 2025-01-21

## 2025-01-21 RX ORDER — TELMISARTAN 40 MG/1
40 TABLET ORAL DAILY
Qty: 90 TABLET | Refills: 1 | Status: SHIPPED | OUTPATIENT
Start: 2025-01-21

## 2025-01-21 NOTE — PROGRESS NOTES
Subjective   Keaton Holloway is a 64 y.o. male who presents for a Medicare Well Visit    Patient Care Team:  Tom Garza MD as PCP - General (Internal Medicine)    Recent Hospitalizations: No recent hospitalization(s).    Depression Screen:       2025     1:04 PM   PHQ-2/PHQ-9 Depression Screening   Little interest or pleasure in doing things Not at all   Feeling down, depressed, or hopeless Not at all       Functional and Cognitive Screenin/21/2025     1:04 PM   Functional & Cognitive Status   Do you have difficulty preparing food and eating? No   Do you have difficulty bathing yourself, getting dressed or grooming yourself? No   Do you have difficulty using the toilet? No   Do you have difficulty moving around from place to place? No   Do you have trouble with steps or getting out of a bed or a chair? No   Current Diet Well Balanced Diet   Dental Exam Up to date   Eye Exam Up to date   Exercise (times per week) 2 times per week   Current Exercises Include Gardening;Light Weights   Do you need help using the phone?  No   Are you deaf or do you have serious difficulty hearing?  No   Do you need help to go to places out of walking distance? No   Do you need help shopping? No   Do you need help preparing meals?  No   Do you need help with housework?  No   Do you need help with laundry? No   Do you need help taking your medications? No   Do you need help managing money? No   Do you ever drive or ride in a car without wearing a seat belt? No   Have you felt unusual stress, anger or loneliness in the last month? No   Who do you live with? Spouse   If you need help, do you have trouble finding someone available to you? No   Have you been bothered in the last four weeks by sexual problems? No   Do you have difficulty concentrating, remembering or making decisions? No       Does the patient have evidence of cognitive impairment? no    No opioid medication identified on active medication list. I have  "reviewed chart for other potential  high risk medication/s and harmful drug interactions in the elderly.          Does not need ASA (and currently is not on it)    Vitals:    01/21/25 1305   BP: 142/90   BP Location: Left arm   Patient Position: Sitting   Cuff Size: Adult   Pulse: 63   Temp: 97.5 °F (36.4 °C)   TempSrc: Infrared   SpO2: 98%   Weight: 72.1 kg (159 lb)   Height: 170.2 cm (67.01\")   PainSc: 0-No pain       Body mass index is 24.9 kg/m².    Visual Acuity:  No results found.    Advanced Care Planning:  ACP discussion was held with the patient during this visit. Patient has an advance directive (not in EMR), copy requested.    Compared to one year ago, the patient feels physical health is the same.  Compared to one year ago, the patient feels mental health is the same.    Reviewed chart for potential of high risk medication in the elderly: yes  Reviewed chart for potential of harmful drug interactions in the elderly:yes    Identification of Risk Factors:  Risk factors include: Advance Directive Discussion  Cardiovascular risk  Colon Cancer Screening  Diabetic Lab Screening   Hearing Problem  Immunizations Discussed/Encouraged (specific immunizations; Tdap, Hepatitis A Vaccine/Series, Influenza, Prevnar 20 (Pneumococcal 20-valent conjugate), Shingrix, COVID19, and RSV (Respiratory Syncytial Virus) )  Inactivity/Sedentary  Prostate Cancer Screening .    Patient Self-Management and Personalized Health Advice  The patient has been provided with information about: diet, exercise, and prevention of cardiac or vascular disease and preventive services including:   Annual Wellness Visit (AWV)  Colorectal Cancer Screening, Colonoscopy  Pneumococcal Vaccine and Administration  Prostate Cancer Screening .  Follow Up: Medicare visit in one year    Discussed the patient's BMI with him. The BMI is in the acceptable range.    Patient has multiple medical problems which are significant and separately identifiable that " "require additional work above and beyond the Medicare Wellness Visit. These are not trivial or insignificant and are billed with a 25-modifier    Chief Complaint   Patient presents with    Welcome To Medicare     Review of medical issues.       HPI:  Keaton Holloway is a 64 y.o. male RTC in [facility], review of medical issues:  \"Yes and no\".  Had sinus infection 2-3 weeks ago. Used anti-H and 'not working'.  Changed to Mucinex DM, 'worked great'.    1. Essential hypertension - on one drug. Had some number spikes recently when taking Mucinex DM, 'I think it is DM'.  ~1/6/25.  However, is not sure if D or DM.  Got up 180-190 range, but improved in subsequent days. \"I felt high\" during the time with high numbers.  In last 2 weeks numbers are hanging around 130-150 range.  No longer 'feeling high'.  Was on meloxicam at time of elevated numbers.   2.  R elbow pain - sx over year after water skiing last summer.  Persisted and used NSAIDs for control.  Saw ortho and had MRI that showed no tear.  No clear dx. Given PT and Meloxicam Rx, working with PT now.  Still has a few days left of meloxicam for 30 day Rx.   3. CHRIS vs. Adjustment D/O with anxiety; did not tolerate wean to 1/2 dose with noted home stressors in 2021 - mood controlled on 10mg escitalopram daily.  Feels like med is helpful. Has some stressed with mother in law in house.   4. Environmental Allergies; on immunotherapy for years, retested 9/2022 due to decline in benefit. New formula shots in 11/2022 with rapid uptitration completed - well cotnrolled on maintenance once weekly --> q 2 weeks now.  Still on daily nasal rinse and Veramyst.  Nasal antihistamine PRN only and not used in last 6 months, more seasonally.   5.  Heartburn- resolved recently, no issues.  Off pepcid. No issues swallowing with no catching of food. No N/V.,   6. Nephrolithiasis - recurrent in 2020 s/p retrieval; persists with one retained stone. Saw urology 2/2024 and had stable XR. Appt " upcoming.   7. Hx of BCC - dx'd by derm, removed OPT ~2016. Sees Dr. Nieves annually, had appt 11/2024. Annual appt [place].  No lesions of concern today  8. Inguinal hernia (chronic) and small, reducible umbilical hernia - had been bothering pt more.  Was hurting and now 'stopped hurting again' in last few weeks.  NO growth in size, no longer feels 'knot'.  Was worse with cough recently.       Review of Systems   Constitutional: Negative for chills, fever, malaise/fatigue and weight loss.   HENT:  Negative for congestion, hearing loss, odynophagia and sore throat.    Eyes:  Positive for blurred vision (needs new glasses). Negative for discharge, double vision, pain and redness.        Last eye exam 1/2024, sees annually     Cardiovascular:  Negative for chest pain, dyspnea on exertion, irregular heartbeat, leg swelling, near-syncope, palpitations and syncope.   Respiratory:  Negative for cough and shortness of breath.    Endocrine: Negative for polydipsia, polyphagia and polyuria.   Hematologic/Lymphatic: Negative for bleeding problem. Does not bruise/bleed easily.   Skin:  Negative for rash and suspicious lesions.   Musculoskeletal:  Positive for joint pain (R anterior elbow). Negative for joint swelling, muscle cramps, muscle weakness and myalgias.   Gastrointestinal:  Negative for constipation, diarrhea, dysphagia (Occasional, rare choking drinking liquids quickly), heartburn, nausea and vomiting.   Genitourinary:  Negative for dysuria, frequency, hematuria, hesitancy and incomplete emptying.   Neurological:  Negative for dizziness, headaches and light-headedness.   Psychiatric/Behavioral:  Negative for depression. The patient is not nervous/anxious.    Allergic/Immunologic: Positive for environmental allergies. Negative for persistent infections.     @OBJECTIVE BEGIN@  Vitals:    01/21/25 1305   BP: 142/90   Pulse: 63   Temp: 97.5 °F (36.4 °C)   SpO2: 98%     Physical Exam  Vitals reviewed.   Constitutional:        General: He is not in acute distress.     Appearance: Normal appearance. He is well-developed. He is not ill-appearing or toxic-appearing.   HENT:      Head: Normocephalic and atraumatic.      Right Ear: Hearing, tympanic membrane, ear canal and external ear normal. There is no impacted cerumen.      Left Ear: Hearing, tympanic membrane, ear canal and external ear normal. There is no impacted cerumen.      Nose: Nose normal.      Mouth/Throat:      Mouth: Mucous membranes are moist. No oral lesions.      Tongue: No lesions.      Pharynx: Oropharynx is clear. Uvula midline. No pharyngeal swelling, oropharyngeal exudate, posterior oropharyngeal erythema or uvula swelling.   Eyes:      General: Lids are normal. No scleral icterus.        Right eye: No discharge.         Left eye: No discharge.      Extraocular Movements: Extraocular movements intact.      Conjunctiva/sclera: Conjunctivae normal.      Pupils: Pupils are equal, round, and reactive to light.   Neck:      Thyroid: No thyroid mass or thyromegaly.      Vascular: No carotid bruit.   Cardiovascular:      Rate and Rhythm: Normal rate and regular rhythm.      Pulses:           Radial pulses are 2+ on the right side and 2+ on the left side.        Dorsalis pedis pulses are 2+ on the right side and 2+ on the left side.        Posterior tibial pulses are 2+ on the right side and 2+ on the left side.      Heart sounds: Normal heart sounds, S1 normal and S2 normal. No murmur heard.     No friction rub. No gallop.   Pulmonary:      Effort: Pulmonary effort is normal. No respiratory distress.      Breath sounds: Normal breath sounds. No wheezing, rhonchi or rales.   Abdominal:      General: Bowel sounds are normal. There is no distension.      Palpations: Abdomen is soft. There is no mass.      Tenderness: There is no abdominal tenderness. There is no guarding or rebound.      Hernia: A hernia is present. Hernia is present in the umbilical area (There is small,  reducible, nontender) and right inguinal area (Palpable only with cough, in inguinal canal, nontender).   Genitourinary:     Prostate: Normal. Enlarged (Mild, diffuse). Not tender and no nodules present.      Rectum: Normal. No external hemorrhoid. Normal anal tone.   Musculoskeletal:         General: No deformity. Normal range of motion.      Right shoulder: No tenderness, bony tenderness or crepitus. Normal range of motion.      Left shoulder: No tenderness, bony tenderness or crepitus. Normal range of motion.      Right hand: No tenderness or bony tenderness. Normal range of motion. Normal strength.      Left hand: No tenderness or bony tenderness. Normal range of motion. Normal strength.      Cervical back: Full passive range of motion without pain, normal range of motion and neck supple.      Right lower leg: No edema.      Left lower leg: No edema.   Lymphadenopathy:      Cervical: No cervical adenopathy.      Right cervical: No superficial, deep or posterior cervical adenopathy.     Left cervical: No superficial, deep or posterior cervical adenopathy.      Upper Body:      Right upper body: No supraclavicular, axillary or pectoral adenopathy.      Left upper body: No supraclavicular, axillary or pectoral adenopathy.   Skin:     General: Skin is warm and dry.      Findings: No rash.   Neurological:      Mental Status: He is alert and oriented to person, place, and time.      Cranial Nerves: No cranial nerve deficit, dysarthria or facial asymmetry.      Sensory: No sensory deficit.      Motor: No weakness, tremor, atrophy or abnormal muscle tone.      Gait: Gait normal.      Deep Tendon Reflexes: Reflexes are normal and symmetric.      Reflex Scores:       Patellar reflexes are 2+ on the right side and 2+ on the left side.       Achilles reflexes are 2+ on the right side and 2+ on the left side.  Psychiatric:         Attention and Perception: Attention normal.         Mood and Affect: Mood normal.          Speech: Speech normal.         Behavior: Behavior normal. Behavior is cooperative.         Thought Content: Thought content normal.           ECG 12 Lead    Date/Time: 1/21/2025 2:01 PM  Performed by: Tom Garza MD    Authorized by: Tom Garza MD  Comparison: compared with previous ECG from 8/8/2019  Similar to previous ECG  Rhythm: sinus bradycardia  Rate: normal  BPM: 58  Conduction: conduction normal  ST Segments: ST segments normal  T Waves: T waves normal  T inversion: III  QRS axis: normal    Clinical impression: normal ECG  Comments: QTc - 387  Indication - [facility]          Assessment & Plan   Diagnoses and all orders for this visit:    1. Encounter for general adult medical examination with abnormal findings (Primary)  -     COVID-19 (Pfizer) 12yrs+ (COMIRNATY)  -     Pneumococcal Conjugate Vaccine 20-Valent All    2. Adenomatous polyp of colon, unspecified part of colon    3. Dyslipidemia    4. Diverticulosis of large intestine without hemorrhage    5. Environmental and seasonal allergies    6. Generalized anxiety disorder    7. Essential hypertension  -     telmisartan (MICARDIS) 40 MG tablet; Take 1 tablet by mouth Daily.  Dispense: 90 tablet; Refill: 1    8. Hx of acute prostatitis    9. Non-recurrent unilateral inguinal hernia without obstruction or gangrene    10. Nephrolithiasis    11. Umbilical hernia without obstruction and without gangrene    12. Seborrheic dermatitis    13. Sensorineural hearing loss (SNHL) of both ears  -     Ambulatory Referral to Audiology    14. Need for COVID-19 vaccine  -     COVID-19 (Pfizer) 12yrs+ (COMIRNATY)    15. Need for pneumococcal 20-valent conjugate vaccination  -     Pneumococcal Conjugate Vaccine 20-Valent All    16. Uncontrolled hypertension  -     telmisartan (MICARDIS) 40 MG tablet; Take 1 tablet by mouth Daily.  Dispense: 90 tablet; Refill: 1    Other orders  -     ECG 12 Lead          Diagnoses and all orders for this visit:    Encounter for general  adult medical examination with abnormal findings  -     COVID-19 (Pfizer) 12yrs+ (COMIRNATY)  -     Pneumococcal Conjugate Vaccine 20-Valent All    Adenomatous polyp of colon, unspecified part of colon    Dyslipidemia    Diverticulosis of large intestine without hemorrhage    Environmental and seasonal allergies    Generalized anxiety disorder    Essential hypertension  -     telmisartan (MICARDIS) 40 MG tablet; Take 1 tablet by mouth Daily.    Hx of acute prostatitis    Non-recurrent unilateral inguinal hernia without obstruction or gangrene    Nephrolithiasis    Umbilical hernia without obstruction and without gangrene    Seborrheic dermatitis    Sensorineural hearing loss (SNHL) of both ears  -     Ambulatory Referral to Audiology    Need for COVID-19 vaccine  -     COVID-19 (Pfizer) 12yrs+ (COMIRNATY)    Need for pneumococcal 20-valent conjugate vaccination  -     Pneumococcal Conjugate Vaccine 20-Valent All    Uncontrolled hypertension  -     telmisartan (MICARDIS) 40 MG tablet; Take 1 tablet by mouth Daily.    Other orders  -     Discontinue: meloxicam (MOBIC) 15 MG tablet; Take 1 tablet by mouth Daily.  -     ECG 12 Lead            Keaton Holloway is a 64 y.o. male RTC in [facility], review of medical issues:    1. Essential hypertension -uncontrolled on low-dose ARB.  Confounded by recent meloxicam use from Ortho with some spikes up to 180-190.  Uncontrolled in office today.  D/W patient good opportunity to transition away from short acting losartan to more potent and longer acting ARB medication.  D/C losartan.  Start telmisartan 40 mg daily. Recheck 4 weeks, BMP same day.   2. Chronic R elbow pain, suspected 'biceps or brachialis strain versus radial tunnel syndrome', per ortho - rs/p ortho eval 12/2024, referral to PT and currently working with PT.  Complete meloxicam Rx for the next few days and advised not to restart given confounding issue with elevated blood pressure.  3. Dyslipidemia, hsCRP low on 10/2021  labs - HDL declined to < 40 off exercise.  No known vascular disease.  Patient aware of issue and is insistent on TLC modifications with CV exercise.  Defer statin today.  Trend HDL and LDL in short interval labs  4. CHRIS vs. Adjustment D/O with anxiety; did not tolerate wean to 1/2 dose with noted home stressors in 2021 - mood controlled on 10mg escitalopram daily.    5. Environmental Allergies; on immunotherapy for years, retested 9/2022 due to decline in benefit. New formula shots in 11/2022 with rapid uptitration completed - well cotnrolled on maintenance q 2 weeks and daily nasal rinse/ Veramyst.  Nasal antihistamine PRN only seasonally.   6.  Heartburn - CARIDAD, off pepcid. No red flag sx.   7. Nephrolithiasis - recurrent in 2020 s/p retrieval; persists with one retained stone. UTD urology 2/2024, stable XR. F/U urology annually.   8. Hx of BCC - dx'd by derm, removed OPT ~2016. Sees Dr. Nieves annually, annual appt [place]. No lesions of concern noted on exam.   9. Inguinal hernia (chronic) and small, reducible umbilical hernia -minimal pain issues noted recently with bronchitis cough event, but resolved at this time.  Exam with very small umbilical and right inguinal hernia that are nontender and fully reducible.  Offered reassurance at this time.  RTC if progressive size or recurrence of pain, watchful waiting. Pt aware of s/s incarcerated hernia and to ED if occurs   10. HM - labs d/w pt; Flu/ Tdap/ Hep A/ Shingrix/ COVID - UTD; COVID update and Prevnar 20- today; C-scope 6/2017 (-) --> 3/2023 (3 TA) --> 3 years; GEOVANY/ PSA OK (stable after ~1 point increase in 2023, s/p urology eval);  Hep C Ab (-) 8/2019; add more CV exercise; refer for audiology exam with noted mild hearing loss bilaterally;Preventative care plan provided to pt at end of visit    Return in about 6 weeks (around 3/4/2025) for Recheck.  (BMP, HDL, LDL)          Current Outpatient Medications:     escitalopram (LEXAPRO) 10 MG tablet, TAKE 1  TABLET BY MOUTH EVERY DAY, Disp: 90 tablet, Rfl: 1    famotidine (Pepcid) 20 MG tablet, Take 1 tablet by mouth 2 (Two) Times a Day As Needed for Heartburn., Disp: , Rfl:     fluticasone (VERAMYST) 27.5 MCG/SPRAY nasal spray, Administer 2 sprays into the nostril(s) as directed by provider 2 (Two) Times a Day., Disp: , Rfl:     Saw Palmetto 1000 MG capsule, Take  by mouth., Disp: , Rfl:     azelastine (ASTEPRO) 0.15 % solution nasal spray, Administer 2 sprays into the nostril(s) as directed by provider. (Patient not taking: Reported on 1/21/2025), Disp: , Rfl:     desonide (DESOWEN) 0.05 % cream, APPLY DAILY TO SKIN TO AFFECTED AREA EVERY DAY (Patient not taking: Reported on 1/21/2025), Disp: , Rfl:     Diclofenac Sodium (VOLTAREN) 1 % gel gel, Apply 4 g topically to the appropriate area as directed 4 (Four) Times a Day. (Patient not taking: Reported on 1/21/2025), Disp: , Rfl:     telmisartan (MICARDIS) 40 MG tablet, Take 1 tablet by mouth Daily., Disp: 90 tablet, Rfl: 1    triamcinolone (KENALOG) 0.1 % cream, Apply  topically to the appropriate area as directed 2 (Two) Times a Day. (Patient not taking: Reported on 1/21/2025), Disp: 45 g, Rfl: 3

## 2025-01-21 NOTE — LETTER
[facility]  Vaccine Consent Form    Patient Name:  Keaton Holloway  Patient :  1960     Vaccine(s) Ordered    COVID-19 (Pfizer) 12yrs+ (COMIRNATY)  Pneumococcal Conjugate Vaccine 20-Valent All        Screening Checklist  The following questions should be completed prior to vaccination. If you answer “yes” to any question, it does not necessarily mean you should not be vaccinated. It just means we may need to clarify or ask more questions. If a question is unclear, please ask your healthcare provider to explain it.    Yes No   Any fever or moderate to severe illness today (mild illness and/or antibiotic treatment are not contraindications)?     Do you have a history of a serious reaction to any previous vaccinations, such as anaphylaxis, encephalopathy within 7 days, Guillain-Le Roy syndrome within 6 weeks, seizure?     Have you received any live vaccine(s) (e.g MMR, PETR) or any other vaccines in the last month (to ensure duplicate doses aren't given)?     Do you have an anaphylactic allergy to latex (DTaP, DTaP-IPV, Hep A, Hep B, MenB, RV, Td, Tdap), baker’s yeast (Hep B, HPV), polysorbates (RSV, nirsevimab, PCV 20, Rotavirrus, Tdap, Shingrix), or gelatin (PETR, MMR)?     Do you have an anaphylactic allergy to neomycin (Rabies, PETR, MMR, IPV, Hep A), polymyxin B (IPV), or streptomycin (IPV)?      Any cancer, leukemia, AIDS, or other immune system disorder? (PETR, MMR, RV)     Do you have a parent, brother, or sister with an immune system problem (if immune competence of vaccine recipient clinically verified, can proceed)? (MMR, PETR)     Any recent steroid treatments for >2 weeks, chemotherapy, or radiation treatment? (PETR, MMR)     Have you received antibody-containing blood transfusions or IVIG in the past 11 months (recommended interval is dependent on product)? (MMR, PETR)     Have you taken antiviral drugs (acyclovir, famciclovir, valacyclovir for PETR) in the last 24 or 48 hours, respectively?      Are  "you pregnant or planning to become pregnant within 1 month? (PETR, MMR, HPV, IPV, MenB, Abrexvy; For Hep B- refer to Engerix-B; For RSV - Abrysvo is indicated for 32-36 weeks of pregnancy from September to January)     For infants, have you ever been told your child has had intussusception or a medical emergency involving obstruction of the intestine (Rotavirus)? If not for an infant, can skip this question.         *Ordering Physicians/APC should be consulted if \"yes\" is checked by the patient or guardian above.  I have received, read, and understand the Vaccine Information Statement (VIS) for each vaccine ordered.  I have considered my or my child's health status as well as the health status of my close contacts.  I have taken the opportunity to discuss my vaccine questions with my or my child's health care provider.   I have requested that the ordered vaccine(s) be given to me or my child.  I understand the benefits and risks of the vaccines.  I understand that I should remain in the clinic for 15 minutes after receiving the vaccine(s).  _________________________________________________________  Signature of Patient or Parent/Legal Guardian ____________________  Date     "

## 2025-01-21 NOTE — PATIENT INSTRUCTIONS
Medicare Wellness  Personal Prevention Plan of Service     Date of Office Visit:    Encounter Provider:  Tom Garza MD  Place of Service:  [facility]  Patient Name: Keaton Holloway  :  1960    As part of the Medicare Wellness portion of your visit today, we are providing you with this personalized preventive plan of services (PPPS). This plan is based upon recommendations of the [place] Preventive Services Task Force (USPSTF) and the Advisory Committee on Immunization Practices (ACIP).    This lists the preventive care services that should be considered, and provides dates of when you are due. Items listed as completed are up-to-date and do not require any further intervention.    Health Maintenance   Topic Date Due    ANNUAL WELLNESS VISIT  2026    COLORECTAL CANCER SCREENING  03/10/2026    TDAP/TD VACCINES (3 - Td or Tdap) 2029    HEPATITIS C SCREENING  Completed    COVID-19 Vaccine  Completed    INFLUENZA VACCINE  Completed    Pneumococcal Vaccine 65+  Completed    ZOSTER VACCINE  Completed       Orders Placed This Encounter   Procedures    COVID-19 (Pfizer) 12yrs+ (COMIRNATY)    Pneumococcal Conjugate Vaccine 20-Valent All    Ambulatory Referral to Audiology     Referral Priority:   Routine     Referral Type:   Consultation     Referral Reason:   Specialty Services Required     Requested Specialty:   Audiology     Number of Visits Requested:   1    ECG 12 Lead     This order was created via procedure documentation     Order Specific Question:   Release to patient     Answer:   Routine Release [[id number]]       Return in about 6 weeks (around 3/4/2025) for Recheck.

## 2025-03-05 DIAGNOSIS — I10 ESSENTIAL HYPERTENSION: ICD-10-CM

## 2025-03-05 DIAGNOSIS — E78.5 DYSLIPIDEMIA: Primary | ICD-10-CM

## 2025-03-07 LAB
BUN SERPL-MCNC: 18 MG/DL (ref 8–23)
BUN/CREAT SERPL: 14.9 (ref 7–25)
CALCIUM SERPL-MCNC: 9.4 MG/DL (ref 8.6–10.5)
CHLORIDE SERPL-SCNC: 105 MMOL/L (ref 98–107)
CO2 SERPL-SCNC: 25.9 MMOL/L (ref 22–29)
CREAT SERPL-MCNC: 1.21 MG/DL (ref 0.76–1.27)
EGFRCR SERPLBLD CKD-EPI 2021: 66 ML/MIN/1.73
GLUCOSE SERPL-MCNC: 90 MG/DL (ref 65–99)
HDLC SERPL-MCNC: 36 MG/DL (ref 40–60)
LDLC SERPL DIRECT ASSAY-MCNC: 152 MG/DL (ref 0–100)
POTASSIUM SERPL-SCNC: 4.6 MMOL/L (ref 3.5–5.2)
SODIUM SERPL-SCNC: 142 MMOL/L (ref 136–145)

## 2025-03-10 ENCOUNTER — OFFICE VISIT (OUTPATIENT)
Dept: INTERNAL MEDICINE | Facility: CLINIC | Age: 65
End: 2025-03-10
Payer: MEDICARE

## 2025-03-10 VITALS
OXYGEN SATURATION: 98 % | TEMPERATURE: 98 F | HEIGHT: 67 IN | WEIGHT: 160.6 LBS | BODY MASS INDEX: 25.21 KG/M2 | SYSTOLIC BLOOD PRESSURE: 102 MMHG | HEART RATE: 81 BPM | DIASTOLIC BLOOD PRESSURE: 64 MMHG

## 2025-03-10 DIAGNOSIS — E78.5 DYSLIPIDEMIA: ICD-10-CM

## 2025-03-10 DIAGNOSIS — I10 ESSENTIAL HYPERTENSION: Primary | ICD-10-CM

## 2025-03-10 PROCEDURE — 3074F SYST BP LT 130 MM HG: CPT | Performed by: INTERNAL MEDICINE

## 2025-03-10 PROCEDURE — 1126F AMNT PAIN NOTED NONE PRSNT: CPT | Performed by: INTERNAL MEDICINE

## 2025-03-10 PROCEDURE — 1159F MED LIST DOCD IN RCRD: CPT | Performed by: INTERNAL MEDICINE

## 2025-03-10 PROCEDURE — 1160F RVW MEDS BY RX/DR IN RCRD: CPT | Performed by: INTERNAL MEDICINE

## 2025-03-10 PROCEDURE — 3078F DIAST BP <80 MM HG: CPT | Performed by: INTERNAL MEDICINE

## 2025-03-10 PROCEDURE — 99213 OFFICE O/P EST LOW 20 MIN: CPT | Performed by: INTERNAL MEDICINE

## 2025-03-10 NOTE — PROGRESS NOTES
"Essential hypertension and Dyslipidemia      HPI  Keaton Holloway is a 65 y.o. male RTC in f/u: Has just come back from [place] and had a good time.   \"I only gained one pound\".   1. Essential hypertension - uncontrolled on low-dose ARB at last visit.  Changed to telmisartan and 'I could not tell a difference' with side effects or tolerance. No issues.   No home log, has not checked at all.  No low pressure symptoms noted.  Can occasionally have fleeting dizziness when leans over to  golf ball, but no presyncope issues.    2. Chronic R elbow pain, suspected 'biceps or brachialis strain versus radial tunnel syndrome', per ortho - resolved issue, 'my arm does not hurt anymore'.    3. Dyslipidemia, hsCRP low on 10/2021 labs - HDL declined to < 40 off exercise. Pt aware.  Notes is 'starting to exericse'.  Has resolved arm issue with bicep issues and getting back to more exercise.  Playing golf now, the weather is beautiful'.     Answers submitted by the patient for this visit:  High Blood Pressure Questionnaire (Submitted on 3/6/2025)  Chief Complaint: Hypertension  Chronicity: chronic  Onset: more than 1 year ago  Progression since onset: improved  anxiety: No  peripheral edema: No  Agents associated with hypertension: no associated agents  Compliance problems: no compliance problems    Review of Systems   Constitutional: Negative for malaise/fatigue.   Eyes:  Negative for blurred vision.   Cardiovascular:  Negative for chest pain, near-syncope, orthopnea, palpitations and syncope.   Respiratory:  Negative for shortness of breath.    Neurological:  Negative for dizziness and headaches.       The following portions of the patient's history were reviewed and updated as appropriate: allergies, current medications, past medical history, past social history, and problem list.      Current Outpatient Medications:     escitalopram (LEXAPRO) 10 MG tablet, TAKE 1 TABLET BY MOUTH EVERY DAY, Disp: 90 tablet, Rfl: 1    " "famotidine (Pepcid) 20 MG tablet, Take 1 tablet by mouth 2 (Two) Times a Day As Needed for Heartburn., Disp: , Rfl:     fluticasone (VERAMYST) 27.5 MCG/SPRAY nasal spray, Administer 2 sprays into the nostril(s) as directed by provider 2 (Two) Times a Day., Disp: , Rfl:     Saw Palmetto 1000 MG capsule, Take  by mouth., Disp: , Rfl:     telmisartan (MICARDIS) 40 MG tablet, Take 1 tablet by mouth Daily., Disp: 90 tablet, Rfl: 1    Vitals:    03/10/25 1055   BP: 102/64   BP Location: Left arm   Patient Position: Sitting   Cuff Size: Adult   Pulse: 81   Temp: 98 °F (36.7 °C)   TempSrc: Infrared   SpO2: 98%   Weight: 72.8 kg (160 lb 9.6 oz)   Height: 170.2 cm (67.01\")     Body mass index is 25.15 kg/m².      Physical Exam  Vitals reviewed.   Constitutional:       General: He is not in acute distress.     Appearance: He is well-developed. He is not ill-appearing or toxic-appearing.   HENT:      Head: Normocephalic and atraumatic.      Mouth/Throat:      Mouth: No oral lesions.      Tongue: No lesions.      Pharynx: No pharyngeal swelling or uvula swelling.   Eyes:      General: No scleral icterus.     Pupils: Pupils are equal, round, and reactive to light.   Neck:      Vascular: No carotid bruit.   Cardiovascular:      Rate and Rhythm: Normal rate and regular rhythm.      Pulses:           Carotid pulses are 2+ on the right side and 2+ on the left side.       Radial pulses are 2+ on the right side and 2+ on the left side.      Heart sounds: Normal heart sounds.   Pulmonary:      Effort: Pulmonary effort is normal. No respiratory distress.      Breath sounds: Normal breath sounds. No wheezing, rhonchi or rales.   Musculoskeletal:      Cervical back: Normal range of motion and neck supple. No muscular tenderness.      Right lower leg: No edema.      Left lower leg: No edema.   Neurological:      Mental Status: He is alert and oriented to person, place, and time.      Cranial Nerves: No cranial nerve deficit, dysarthria or " facial asymmetry.      Gait: Gait normal.   Psychiatric:         Attention and Perception: Attention normal.         Mood and Affect: Mood and affect normal.         Behavior: Behavior normal.         Thought Content: Thought content normal.         Assessment/ Plan  Diagnoses and all orders for this visit:    Essential hypertension    Dyslipidemia        Return in about 5 months (around 8/10/2025) for Recheck.      Discussion:  Keaton Holloway is a 65 y.o. male RTC in f/u:  1. Essential hypertension - uncontrolled on low-dose losartan at last visit, confounded by meloxicam use at the time.  S/p change to longer acting telmisartan 40 mg daily.  Good tolerance.  BP well-controlled in office today.  No low pressure SX.  Start home BP monitoring log.  BMP OK.    2. Chronic R elbow pain, suspected 'biceps or brachialis strain versus radial tunnel syndrome', per ortho - s/p ortho eval 12/2024, and  PT with resolved SX.    3. Dyslipidemia, hsCRP low on 10/2021 labs - HDL declined to < 40 off exercise, persistent.  LDL progressed today, but confounded by recent overseas trip with unusually high volume dietary intake.  Recall, no known vascular disease and 10-year CV risk  confounded by male over age 65.  Will trend lipids at end of summer season (more active) with Lp(a) next visit and assess indication for statin at that time.  Counseled patient.      RTC 5 months, F labs prior (CMP, FLP,Lp(a))

## 2025-03-24 ENCOUNTER — PATIENT MESSAGE (OUTPATIENT)
Dept: INTERNAL MEDICINE | Facility: CLINIC | Age: 65
End: 2025-03-24
Payer: MEDICARE

## 2025-03-24 DIAGNOSIS — R22.2 ABDOMINAL WALL MASS: Primary | ICD-10-CM

## 2025-03-27 ENCOUNTER — HOSPITAL ENCOUNTER (OUTPATIENT)
Dept: CT IMAGING | Facility: HOSPITAL | Age: 65
Discharge: HOME | End: 2025-03-27
Admitting: INTERNAL MEDICINE
Payer: MEDICARE

## 2025-03-27 DIAGNOSIS — R22.2 ABDOMINAL WALL MASS: ICD-10-CM

## 2025-03-27 PROCEDURE — 74176 CT ABD & PELVIS W/O CONTRAST: CPT

## 2025-04-01 DIAGNOSIS — K40.90 RIGHT INGUINAL HERNIA: Primary | ICD-10-CM

## 2025-04-21 ENCOUNTER — OFFICE VISIT (OUTPATIENT)
Dept: SURGERY | Facility: CLINIC | Age: 65
End: 2025-04-21
Payer: MEDICARE

## 2025-04-21 VITALS
HEART RATE: 67 BPM | OXYGEN SATURATION: 97 % | WEIGHT: 157.8 LBS | BODY MASS INDEX: 24.77 KG/M2 | SYSTOLIC BLOOD PRESSURE: 120 MMHG | DIASTOLIC BLOOD PRESSURE: 82 MMHG | HEIGHT: 67 IN

## 2025-04-21 DIAGNOSIS — K40.90 RIGHT INGUINAL HERNIA: Primary | ICD-10-CM

## 2025-04-21 PROCEDURE — 1159F MED LIST DOCD IN RCRD: CPT | Performed by: SURGERY

## 2025-04-21 PROCEDURE — 3079F DIAST BP 80-89 MM HG: CPT | Performed by: SURGERY

## 2025-04-21 PROCEDURE — 1160F RVW MEDS BY RX/DR IN RCRD: CPT | Performed by: SURGERY

## 2025-04-21 PROCEDURE — 99203 OFFICE O/P NEW LOW 30 MIN: CPT | Performed by: SURGERY

## 2025-04-21 PROCEDURE — 3074F SYST BP LT 130 MM HG: CPT | Performed by: SURGERY

## 2025-04-21 RX ORDER — SODIUM CHLORIDE 0.9 % (FLUSH) 0.9 %
10 SYRINGE (ML) INJECTION EVERY 12 HOURS SCHEDULED
OUTPATIENT
Start: 2025-04-21

## 2025-04-21 RX ORDER — SODIUM CHLORIDE 0.9 % (FLUSH) 0.9 %
10 SYRINGE (ML) INJECTION AS NEEDED
OUTPATIENT
Start: 2025-04-21

## 2025-04-21 RX ORDER — SODIUM CHLORIDE 9 MG/ML
40 INJECTION, SOLUTION INTRAVENOUS AS NEEDED
OUTPATIENT
Start: 2025-04-21

## 2025-04-21 NOTE — H&P (VIEW-ONLY)
Cc: Right inguinal hernia    History of presenting illness:   This is a nice 65-year-old gentleman who had a small bulge in the right groin for several years but over the last several months has grown larger and causing him more discomfort.  It now interferes with him when he is trying to golf or take care of his grandchildren.  He is able to reduce this without much difficulty.    Past Medical History: Kidney stones, hypertension, history of prostatitis in the past, currently resolved    Past Surgical History: Significant for kidney stone surgery, colonoscopy most recently 2023, denies any history of abdominal surgery    Medications: Lexapro, Pepcid, Micardis, saw palmetto    Allergies: None known    Social History: He is a non-smoker    Family History: Negative for colorectal cancer    Review of Systems:  Constitutional: Denies fever, chills, change in weight  Neck: no swollen glands or dysphagia or odynophagia  Respiratory: negative for SOB, cough, hemoptysis or wheezing  Cardiovascular: negative for chest pain, palpitations or peripheral edema  Gastrointestinal: Negative for nausea, vomiting, abdominal pain      Physical Exam:  BMI: 24.7  General: alert and oriented, appropriate, no acute distress  Eyes: No scleral icterus, extraocular movements are intact  Neck: Supple without lymphadenopathy or thyromegaly, trachea is in the midline  Respiratory: There is good bilateral chest expansion, no use of accessory muscles is noted  Cardiovascular: No jugular venous distention or peripheral edema is seen  Gastrointestinal: Soft and benign without ventral hernia noted  Genitourinary: Confirmed positive right inguinal hernia reducible, no evidence of hernia on the left on physical exam    Laboratory data: No recent relevant data    Imaging data: CT of the abdomen and pelvis done in March 2025 reviewed by me.  There is evidence of a probable moderate-sized direct inguinal hernia on the right.  There is a likely lipoma  within the left inguinal canal but I do not see a true hernia defect.      Assessment and plan:   -Initial symptomatic right inguinal hernia, reducible  - Options discussed with patient.  I have recommended proceeding with robotic right inguinal hernia repair with da Bambi robot assistance.  - We will examine the left side but clinically no hernia noted    Risks associated with the procedure are noted to include, but not be limited to, bleeding, infection, injury to small or large intestine, major vascular structures, the bladder or ureters.  Possibility of postoperative inguinodynia, mesh migration or infection, hernia recurrence and seroma formation also discussed.      Jameel Bradshaw MD  General, Minimally Invasive and Endoscopic Surgery  [facility]    [address], [address]  [place], [place], [address]  P: [phone]  F: [phone]

## 2025-04-30 ENCOUNTER — PRE-ADMISSION TESTING (OUTPATIENT)
Dept: PREADMISSION TESTING | Facility: HOSPITAL | Age: 65
End: 2025-04-30
Payer: MEDICARE

## 2025-04-30 VITALS
BODY MASS INDEX: 24.44 KG/M2 | WEIGHT: 161.3 LBS | RESPIRATION RATE: 18 BRPM | TEMPERATURE: 98.2 F | DIASTOLIC BLOOD PRESSURE: 79 MMHG | HEART RATE: 64 BPM | SYSTOLIC BLOOD PRESSURE: 123 MMHG | HEIGHT: 68 IN | OXYGEN SATURATION: 96 %

## 2025-04-30 DIAGNOSIS — K40.90 RIGHT INGUINAL HERNIA: ICD-10-CM

## 2025-04-30 LAB
ANION GAP SERPL CALCULATED.3IONS-SCNC: 11 MMOL/L (ref 5–15)
BUN SERPL-MCNC: 18 MG/DL (ref 8–23)
BUN/CREAT SERPL: 15.9 (ref 7–25)
CALCIUM SPEC-SCNC: 9 MG/DL (ref 8.6–10.5)
CHLORIDE SERPL-SCNC: 104 MMOL/L (ref 98–107)
CO2 SERPL-SCNC: 26 MMOL/L (ref 22–29)
CREAT SERPL-MCNC: 1.13 MG/DL (ref 0.76–1.27)
DEPRECATED RDW RBC AUTO: 44.8 FL (ref 37–54)
EGFRCR SERPLBLD CKD-EPI 2021: 71.7 ML/MIN/1.73
ERYTHROCYTE [DISTWIDTH] IN BLOOD BY AUTOMATED COUNT: 13.5 % (ref 12.3–15.4)
GLUCOSE SERPL-MCNC: 108 MG/DL (ref 65–99)
HCT VFR BLD AUTO: 43.4 % (ref 37.5–51)
HGB BLD-MCNC: 14 G/DL (ref 13–17.7)
MCH RBC QN AUTO: 29.2 PG (ref 26.6–33)
MCHC RBC AUTO-ENTMCNC: 32.3 G/DL (ref 31.5–35.7)
MCV RBC AUTO: 90.4 FL (ref 79–97)
PLATELET # BLD AUTO: 203 10*3/MM3 (ref 140–450)
PMV BLD AUTO: 10.2 FL (ref 6–12)
POTASSIUM SERPL-SCNC: 4.3 MMOL/L (ref 3.5–5.2)
RBC # BLD AUTO: 4.8 10*6/MM3 (ref 4.14–5.8)
SODIUM SERPL-SCNC: 141 MMOL/L (ref 136–145)
WBC NRBC COR # BLD AUTO: 5.18 10*3/MM3 (ref 3.4–10.8)

## 2025-04-30 PROCEDURE — 80048 BASIC METABOLIC PNL TOTAL CA: CPT

## 2025-04-30 PROCEDURE — 85027 COMPLETE CBC AUTOMATED: CPT

## 2025-04-30 PROCEDURE — 36415 COLL VENOUS BLD VENIPUNCTURE: CPT

## 2025-04-30 NOTE — DISCHARGE INSTRUCTIONS
Take the following medications the morning of surgery: LEXAPRO      If you are on prescription narcotic pain medication to control your pain you may also take that medication the morning of surgery.      General Instructions:     Do not eat solid food after midnight the night before surgery.  Clear liquids day of surgery are allowed but must be stopped at least two hours before your hospital arrival time.       Allowed clear liquids      Water, sodas, and tea or coffee with no cream or milk added.       12 to 20 ounces of a clear liquid that contains carbohydrates is recommended.  If non-diabetic, have Gatorade or Powerade.  If diabetic, have G2 or Powerade Zero.     Do not have liquids red in color.  Do not consume chicken, beef, pork or vegetable broth or bouillon cubes of any variety as they are not considered clear liquids and are not allowed.      Infants may have breast milk up to four hours before surgery.  Infants drinking formula may drink formula up to six hours before surgery.   Patients who avoid smoking, chewing tobacco and alcohol for 4 weeks prior to surgery have a reduced risk of post-operative complications.  Quit smoking as many days before surgery as you can.  Do not smoke, use chewing tobacco or drink alcohol the day of surgery.   If applicable bring your C-PAP/ BI-PAP machine in with you to preop day of surgery.  Bring any papers given to you in the doctor’s office.  Wear clean comfortable clothes.  Do not wear contact lenses, false eyelashes or make-up.  Bring a case for your glasses.   Bring crutches or walker if applicable.  Remove all piercings.  Leave jewelry and any other valuables at home.  Hair extensions with metal clips must be removed prior to surgery.  The Pre-Admission Testing nurse will instruct you to bring medications if unable to obtain an accurate list in Pre-Admission Testing.    Day of surgery you will need to let the preoperative nurse know the last time you took each of  your medications.  To ensure a safe environment for patients and staff, we kindly ask that children under the age of 16 not accompany patients.  If you must bring a dependent child or dependent adult please ensure a responsible adult, other than yourself, is present to supervise them.      If you were given a blood bank ID arm band remember to bring it with you the day of surgery.    Preventing a Surgical Site Infection:  For 2 to 3 days before surgery, avoid shaving with a razor because the razor can irritate skin and make it easier to develop an infection.    Any areas of open skin can increase the risk of a post-operative wound infection by allowing bacteria to enter and travel throughout the body.  Notify your surgeon if you have any skin wounds / rashes even if it is not near the expected surgical site.  The area will need assessed to determine if surgery should be delayed until it is healed.  The night prior to surgery shower using a fresh bar of anti-bacterial soap (such as Dial) and clean washcloth.  Sleep in a clean bed with clean clothing.  Do not allow pets to sleep with you.  Shower on the morning of surgery using a fresh bar of anti-bacterial soap (such as Dial) and clean washcloth.  Dry with a clean towel and dress in clean clothing.  Ask your surgeon if you will be receiving antibiotics prior to surgery.  Make sure you, your family, and all healthcare providers clean their hands with soap and water or an alcohol based hand  before caring for you or your wound.  CHLORHEXIDINE CLOTH INSTRUCTIONS  The morning of surgery follow these instructions using the Chlorhexidine cloths you've been given.  These steps reduce bacteria on the body.  Do not use the cloths near your eyes, ears mouth, genitalia or on open wounds.  Throw the cloths away after use but do not try to flush them down a toilet.      Open and remove one cloth at a time from the package.    Leave the cloth unfolded and begin the  bathing.  Massage the skin with the cloths using gentle pressure to remove bacteria.  Do not scrub harshly.   Follow the steps below with one 2% CHG cloth per area (6 total cloths).  One cloth for neck, shoulders and chest.  One cloth for both arms, hands, fingers and underarms (do underarms last).  One cloth for the abdomen followed by groin.  One cloth for right leg and foot including between the toes.  One cloth for left leg and foot including between the toes.  The last cloth is to be used for the back of the neck, back and buttocks.    Allow the CHG to air dry 3 minutes on the skin which will give it time to work and decrease the chance of irritation.  The skin may feel sticky until it is dry.  Do not rinse with water or any other liquid or you will lose the beneficial effects of the CHG.  If mild skin irritation occurs, do rinse the skin to remove the CHG.  Report this to the nurse at time of admission.  Do not apply lotions, creams, ointments, deodorants or perfumes after using the clothes. Dress in clean clothes before coming to the hospital.  Day of surgery:  Your arrival time is approximately two hours before your scheduled surgery time.  Please note if you have an early arrival time the surgery doors do not open before 5:00 AM.  Upon arrival, a Pre-op nurse and Anesthesiologist will review your health history, obtain vital signs, and answer questions you may have.  The only belongings needed at this time will be a list of your home medications and if applicable your C-PAP/BI-PAP machine.  A Pre-op nurse will start an IV and you may receive medication in preparation for surgery, including something to help you relax.     Please be aware that surgery does come with discomfort.  We want to make every effort to control your discomfort so please discuss any uncontrolled symptoms with your nurse.   Your doctor will most likely have prescribed pain medications.      If you are going home after surgery you will  receive individualized written care instructions before being discharged.  A responsible adult must drive you to and from the hospital on the day of your surgery and ideally stay with you through the night.   .  Discharge prescriptions can be filled by the hospital pharmacy during regular pharmacy hours.  If you are having surgery late in the day/evening your prescription may be e-prescribed to your pharmacy.  Please verify your pharmacy hours or chose a 24 hour pharmacy to avoid not having access to your prescription because your pharmacy has closed for the day.    If you are staying overnight following surgery, you will be transported to your hospital room following the recovery period.  [facility] has all private rooms.    If you have any questions please call Pre-Admission Testing at [phone].  Deductibles and co-payments are collected on the day of service. Please be prepared to pay the required co-pay, deductible or deposit on the day of service as defined by your plan.    Call your surgeon immediately if you experience any of the following symptoms:  Sore Throat  Shortness of Breath or difficulty breathing  Cough  Chills  Body soreness or muscle pain  Headache  Fever  New loss of taste or smell  Do not arrive for your surgery ill.  Your procedure will need to be rescheduled to another time.  You will need to call your physician before the day of surgery to avoid any unnecessary exposure to hospital staff as well as other patients.

## 2025-05-09 ENCOUNTER — ANESTHESIA EVENT (OUTPATIENT)
Dept: PERIOP | Facility: HOSPITAL | Age: 65
End: 2025-05-09
Payer: MEDICARE

## 2025-05-09 ENCOUNTER — HOSPITAL ENCOUNTER (OUTPATIENT)
Facility: HOSPITAL | Age: 65
Setting detail: HOSPITAL OUTPATIENT SURGERY
Discharge: HOME | End: 2025-05-09
Attending: SURGERY | Admitting: SURGERY
Payer: MEDICARE

## 2025-05-09 ENCOUNTER — ANESTHESIA (OUTPATIENT)
Dept: PERIOP | Facility: HOSPITAL | Age: 65
End: 2025-05-09
Payer: MEDICARE

## 2025-05-09 VITALS
DIASTOLIC BLOOD PRESSURE: 89 MMHG | RESPIRATION RATE: 16 BRPM | HEIGHT: 67 IN | TEMPERATURE: 97.5 F | WEIGHT: 161.38 LBS | BODY MASS INDEX: 25.33 KG/M2 | OXYGEN SATURATION: 99 % | HEART RATE: 85 BPM | SYSTOLIC BLOOD PRESSURE: 129 MMHG

## 2025-05-09 DIAGNOSIS — K40.90 RIGHT INGUINAL HERNIA: ICD-10-CM

## 2025-05-09 PROCEDURE — 25010000002 FENTANYL CITRATE (PF) 50 MCG/ML SOLUTION: Performed by: NURSE ANESTHETIST, CERTIFIED REGISTERED

## 2025-05-09 PROCEDURE — 25010000002 CEFAZOLIN PER 500 MG: Performed by: SURGERY

## 2025-05-09 PROCEDURE — 25810000003 LACTATED RINGERS PER 1000 ML: Performed by: NURSE ANESTHETIST, CERTIFIED REGISTERED

## 2025-05-09 PROCEDURE — 25010000002 ONDANSETRON PER 1 MG: Performed by: NURSE ANESTHETIST, CERTIFIED REGISTERED

## 2025-05-09 PROCEDURE — 25010000002 LIDOCAINE 2% SOLUTION: Performed by: NURSE ANESTHETIST, CERTIFIED REGISTERED

## 2025-05-09 PROCEDURE — 25010000002 DEXAMETHASONE SODIUM PHOSPHATE 20 MG/5ML SOLUTION: Performed by: NURSE ANESTHETIST, CERTIFIED REGISTERED

## 2025-05-09 PROCEDURE — 25010000002 HYDROMORPHONE 1 MG/ML SOLUTION: Performed by: NURSE ANESTHETIST, CERTIFIED REGISTERED

## 2025-05-09 PROCEDURE — C1781 MESH (IMPLANTABLE): HCPCS | Performed by: SURGERY

## 2025-05-09 PROCEDURE — S2900 ROBOTIC SURGICAL SYSTEM: HCPCS | Performed by: SURGERY

## 2025-05-09 PROCEDURE — 25010000002 KETOROLAC TROMETHAMINE PER 15 MG: Performed by: NURSE ANESTHETIST, CERTIFIED REGISTERED

## 2025-05-09 PROCEDURE — 25810000003 SODIUM CHLORIDE PER 500 ML: Performed by: SURGERY

## 2025-05-09 PROCEDURE — 25010000002 PROPOFOL 10 MG/ML EMULSION: Performed by: NURSE ANESTHETIST, CERTIFIED REGISTERED

## 2025-05-09 PROCEDURE — 49650 LAP ING HERNIA REPAIR INIT: CPT | Performed by: SURGERY

## 2025-05-09 PROCEDURE — 25010000002 PHENYLEPHRINE 10 MG/ML SOLUTION: Performed by: NURSE ANESTHETIST, CERTIFIED REGISTERED

## 2025-05-09 PROCEDURE — 25010000002 SUGAMMADEX 200 MG/2ML SOLUTION: Performed by: NURSE ANESTHETIST, CERTIFIED REGISTERED

## 2025-05-09 DEVICE — 3DMAX MID ANATOMICAL MESH, 10 CM X 16 CM (4" X 6"), LARGE, RIGHT
Type: IMPLANTABLE DEVICE | Site: INGUINAL | Status: FUNCTIONAL
Brand: 3DMAX

## 2025-05-09 DEVICE — DEV WND/CLS CONTRL TISS STRATAFIX SPIRAL MNCRYL SH 2/0 20CM: Type: IMPLANTABLE DEVICE | Site: INGUINAL | Status: FUNCTIONAL

## 2025-05-09 RX ORDER — DROPERIDOL 2.5 MG/ML
0.62 INJECTION, SOLUTION INTRAMUSCULAR; INTRAVENOUS
Status: DISCONTINUED | OUTPATIENT
Start: 2025-05-09 | End: 2025-05-09 | Stop reason: HOSPADM

## 2025-05-09 RX ORDER — HYDROCODONE BITARTRATE AND ACETAMINOPHEN 5; 325 MG/1; MG/1
1 TABLET ORAL ONCE AS NEEDED
Status: COMPLETED | OUTPATIENT
Start: 2025-05-09 | End: 2025-05-09

## 2025-05-09 RX ORDER — DESONIDE 0.5 MG/G
1 CREAM TOPICAL DAILY PRN
COMMUNITY

## 2025-05-09 RX ORDER — ATROPINE SULFATE 0.4 MG/ML
0.4 INJECTION, SOLUTION INTRAMUSCULAR; INTRAVENOUS; SUBCUTANEOUS ONCE AS NEEDED
Status: DISCONTINUED | OUTPATIENT
Start: 2025-05-09 | End: 2025-05-09 | Stop reason: HOSPADM

## 2025-05-09 RX ORDER — KETOROLAC TROMETHAMINE 30 MG/ML
INJECTION, SOLUTION INTRAMUSCULAR; INTRAVENOUS AS NEEDED
Status: DISCONTINUED | OUTPATIENT
Start: 2025-05-09 | End: 2025-05-09 | Stop reason: SURG

## 2025-05-09 RX ORDER — SODIUM CHLORIDE 0.9 % (FLUSH) 0.9 %
10 SYRINGE (ML) INJECTION AS NEEDED
Status: DISCONTINUED | OUTPATIENT
Start: 2025-05-09 | End: 2025-05-09 | Stop reason: HOSPADM

## 2025-05-09 RX ORDER — PROMETHAZINE HYDROCHLORIDE 25 MG/1
25 TABLET ORAL ONCE AS NEEDED
Status: DISCONTINUED | OUTPATIENT
Start: 2025-05-09 | End: 2025-05-09 | Stop reason: HOSPADM

## 2025-05-09 RX ORDER — BUPIVACAINE HYDROCHLORIDE AND EPINEPHRINE 2.5; 5 MG/ML; UG/ML
INJECTION, SOLUTION EPIDURAL; INFILTRATION; INTRACAUDAL; PERINEURAL AS NEEDED
Status: DISCONTINUED | OUTPATIENT
Start: 2025-05-09 | End: 2025-05-09 | Stop reason: HOSPADM

## 2025-05-09 RX ORDER — SODIUM CHLORIDE 9 MG/ML
INJECTION, SOLUTION INTRAVENOUS AS NEEDED
Status: DISCONTINUED | OUTPATIENT
Start: 2025-05-09 | End: 2025-05-09 | Stop reason: HOSPADM

## 2025-05-09 RX ORDER — LABETALOL HYDROCHLORIDE 5 MG/ML
5 INJECTION, SOLUTION INTRAVENOUS
Status: DISCONTINUED | OUTPATIENT
Start: 2025-05-09 | End: 2025-05-09 | Stop reason: HOSPADM

## 2025-05-09 RX ORDER — IPRATROPIUM BROMIDE AND ALBUTEROL SULFATE 2.5; .5 MG/3ML; MG/3ML
3 SOLUTION RESPIRATORY (INHALATION) ONCE AS NEEDED
Status: DISCONTINUED | OUTPATIENT
Start: 2025-05-09 | End: 2025-05-09 | Stop reason: HOSPADM

## 2025-05-09 RX ORDER — DEXAMETHASONE SODIUM PHOSPHATE 4 MG/ML
INJECTION, SOLUTION INTRA-ARTICULAR; INTRALESIONAL; INTRAMUSCULAR; INTRAVENOUS; SOFT TISSUE AS NEEDED
Status: DISCONTINUED | OUTPATIENT
Start: 2025-05-09 | End: 2025-05-09 | Stop reason: SURG

## 2025-05-09 RX ORDER — SODIUM CHLORIDE, SODIUM LACTATE, POTASSIUM CHLORIDE, CALCIUM CHLORIDE 600; 310; 30; 20 MG/100ML; MG/100ML; MG/100ML; MG/100ML
INJECTION, SOLUTION INTRAVENOUS CONTINUOUS PRN
Status: DISCONTINUED | OUTPATIENT
Start: 2025-05-09 | End: 2025-05-09 | Stop reason: SURG

## 2025-05-09 RX ORDER — EPHEDRINE SULFATE 50 MG/ML
INJECTION INTRAVENOUS AS NEEDED
Status: DISCONTINUED | OUTPATIENT
Start: 2025-05-09 | End: 2025-05-09 | Stop reason: SURG

## 2025-05-09 RX ORDER — ONDANSETRON 2 MG/ML
INJECTION INTRAMUSCULAR; INTRAVENOUS AS NEEDED
Status: DISCONTINUED | OUTPATIENT
Start: 2025-05-09 | End: 2025-05-09 | Stop reason: SURG

## 2025-05-09 RX ORDER — OXYCODONE AND ACETAMINOPHEN 7.5; 325 MG/1; MG/1
1 TABLET ORAL EVERY 4 HOURS PRN
Status: DISCONTINUED | OUTPATIENT
Start: 2025-05-09 | End: 2025-05-09 | Stop reason: HOSPADM

## 2025-05-09 RX ORDER — SODIUM CHLORIDE 0.9 % (FLUSH) 0.9 %
10 SYRINGE (ML) INJECTION EVERY 12 HOURS SCHEDULED
Status: DISCONTINUED | OUTPATIENT
Start: 2025-05-09 | End: 2025-05-09 | Stop reason: HOSPADM

## 2025-05-09 RX ORDER — HYDROMORPHONE HYDROCHLORIDE 1 MG/ML
0.5 INJECTION, SOLUTION INTRAMUSCULAR; INTRAVENOUS; SUBCUTANEOUS
Status: DISCONTINUED | OUTPATIENT
Start: 2025-05-09 | End: 2025-05-09 | Stop reason: HOSPADM

## 2025-05-09 RX ORDER — PHENYLEPHRINE HYDROCHLORIDE 10 MG/ML
INJECTION INTRAVENOUS AS NEEDED
Status: DISCONTINUED | OUTPATIENT
Start: 2025-05-09 | End: 2025-05-09 | Stop reason: SURG

## 2025-05-09 RX ORDER — ONDANSETRON 2 MG/ML
4 INJECTION INTRAMUSCULAR; INTRAVENOUS ONCE AS NEEDED
Status: DISCONTINUED | OUTPATIENT
Start: 2025-05-09 | End: 2025-05-09 | Stop reason: HOSPADM

## 2025-05-09 RX ORDER — DIPHENHYDRAMINE HYDROCHLORIDE 50 MG/ML
12.5 INJECTION, SOLUTION INTRAMUSCULAR; INTRAVENOUS
Status: DISCONTINUED | OUTPATIENT
Start: 2025-05-09 | End: 2025-05-09 | Stop reason: HOSPADM

## 2025-05-09 RX ORDER — NALOXONE HCL 0.4 MG/ML
0.2 VIAL (ML) INJECTION AS NEEDED
Status: DISCONTINUED | OUTPATIENT
Start: 2025-05-09 | End: 2025-05-09 | Stop reason: HOSPADM

## 2025-05-09 RX ORDER — SODIUM CHLORIDE 9 MG/ML
40 INJECTION, SOLUTION INTRAVENOUS AS NEEDED
Status: DISCONTINUED | OUTPATIENT
Start: 2025-05-09 | End: 2025-05-09 | Stop reason: HOSPADM

## 2025-05-09 RX ORDER — HYDRALAZINE HYDROCHLORIDE 20 MG/ML
5 INJECTION INTRAMUSCULAR; INTRAVENOUS
Status: DISCONTINUED | OUTPATIENT
Start: 2025-05-09 | End: 2025-05-09 | Stop reason: HOSPADM

## 2025-05-09 RX ORDER — AMOXICILLIN 250 MG
1 CAPSULE ORAL 2 TIMES DAILY PRN
Qty: 30 TABLET | Refills: 1 | Status: SHIPPED | OUTPATIENT
Start: 2025-05-09

## 2025-05-09 RX ORDER — FENTANYL CITRATE 50 UG/ML
INJECTION, SOLUTION INTRAMUSCULAR; INTRAVENOUS AS NEEDED
Status: DISCONTINUED | OUTPATIENT
Start: 2025-05-09 | End: 2025-05-09 | Stop reason: SURG

## 2025-05-09 RX ORDER — PROPOFOL 10 MG/ML
VIAL (ML) INTRAVENOUS AS NEEDED
Status: DISCONTINUED | OUTPATIENT
Start: 2025-05-09 | End: 2025-05-09 | Stop reason: SURG

## 2025-05-09 RX ORDER — LIDOCAINE HYDROCHLORIDE 20 MG/ML
INJECTION, SOLUTION INFILTRATION; PERINEURAL AS NEEDED
Status: DISCONTINUED | OUTPATIENT
Start: 2025-05-09 | End: 2025-05-09 | Stop reason: SURG

## 2025-05-09 RX ORDER — EPHEDRINE SULFATE 50 MG/ML
5 INJECTION, SOLUTION INTRAVENOUS ONCE AS NEEDED
Status: DISCONTINUED | OUTPATIENT
Start: 2025-05-09 | End: 2025-05-09 | Stop reason: HOSPADM

## 2025-05-09 RX ORDER — HYDROCODONE BITARTRATE AND ACETAMINOPHEN 5; 325 MG/1; MG/1
1 TABLET ORAL EVERY 6 HOURS PRN
Qty: 20 TABLET | Refills: 0 | Status: SHIPPED | OUTPATIENT
Start: 2025-05-09 | End: 2025-05-19

## 2025-05-09 RX ORDER — KETOROLAC TROMETHAMINE 30 MG/ML
INJECTION, SOLUTION INTRAMUSCULAR; INTRAVENOUS
Status: COMPLETED
Start: 2025-05-09 | End: 2025-05-09

## 2025-05-09 RX ORDER — PROMETHAZINE HYDROCHLORIDE 25 MG/1
25 SUPPOSITORY RECTAL ONCE AS NEEDED
Status: DISCONTINUED | OUTPATIENT
Start: 2025-05-09 | End: 2025-05-09 | Stop reason: HOSPADM

## 2025-05-09 RX ORDER — FENTANYL CITRATE 50 UG/ML
50 INJECTION, SOLUTION INTRAMUSCULAR; INTRAVENOUS
Status: DISCONTINUED | OUTPATIENT
Start: 2025-05-09 | End: 2025-05-09 | Stop reason: HOSPADM

## 2025-05-09 RX ORDER — ROCURONIUM BROMIDE 10 MG/ML
INJECTION, SOLUTION INTRAVENOUS AS NEEDED
Status: DISCONTINUED | OUTPATIENT
Start: 2025-05-09 | End: 2025-05-09 | Stop reason: SURG

## 2025-05-09 RX ADMIN — EPHEDRINE SULFATE 10 MG: 50 INJECTION INTRAVENOUS at 08:54

## 2025-05-09 RX ADMIN — HYDROMORPHONE HYDROCHLORIDE 1 MG: 1 INJECTION, SOLUTION INTRAMUSCULAR; INTRAVENOUS; SUBCUTANEOUS at 09:01

## 2025-05-09 RX ADMIN — ONDANSETRON 4 MG: 2 INJECTION, SOLUTION INTRAMUSCULAR; INTRAVENOUS at 08:59

## 2025-05-09 RX ADMIN — PHENYLEPHRINE HYDROCHLORIDE 100 MCG: 10 INJECTION INTRAVENOUS at 08:21

## 2025-05-09 RX ADMIN — HYDROCODONE BITARTRATE AND ACETAMINOPHEN 1 TABLET: 5; 325 TABLET ORAL at 09:43

## 2025-05-09 RX ADMIN — PROPOFOL 200 MG: 10 INJECTION, EMULSION INTRAVENOUS at 07:59

## 2025-05-09 RX ADMIN — LIDOCAINE HYDROCHLORIDE 80 MG: 20 INJECTION, SOLUTION INFILTRATION; PERINEURAL at 07:59

## 2025-05-09 RX ADMIN — SODIUM CHLORIDE, POTASSIUM CHLORIDE, SODIUM LACTATE AND CALCIUM CHLORIDE: 600; 310; 30; 20 INJECTION, SOLUTION INTRAVENOUS at 07:50

## 2025-05-09 RX ADMIN — Medication 10 ML: at 07:22

## 2025-05-09 RX ADMIN — ROCURONIUM BROMIDE 50 MG: 10 INJECTION, SOLUTION INTRAVENOUS at 07:59

## 2025-05-09 RX ADMIN — SUGAMMADEX 200 MG: 100 INJECTION, SOLUTION INTRAVENOUS at 08:59

## 2025-05-09 RX ADMIN — FENTANYL CITRATE 50 MCG: 50 INJECTION, SOLUTION INTRAMUSCULAR; INTRAVENOUS at 07:51

## 2025-05-09 RX ADMIN — DEXAMETHASONE SODIUM PHOSPHATE 8 MG: 4 INJECTION, SOLUTION INTRAMUSCULAR; INTRAVENOUS at 08:13

## 2025-05-09 RX ADMIN — KETOROLAC TROMETHAMINE 30 MG: 30 INJECTION, SOLUTION INTRAMUSCULAR; INTRAVENOUS at 09:20

## 2025-05-09 RX ADMIN — CEFAZOLIN 2000 MG: 2 INJECTION, POWDER, FOR SOLUTION INTRAMUSCULAR; INTRAVENOUS at 07:49

## 2025-05-09 NOTE — OP NOTE
Operative Note :   MD Keaton Garcia SHUN Amie  1960    Procedure Date: 05/09/25    Pre-op Diagnosis:  Right inguinal hernia [K40.90]    Post-Op Diagnosis:  Same    Procedure:   Laparoscopic right inguinal hernia repair with da Bambi robot assistance    Surgeon: Jameel Bradshaw MD    Assistant: Mattie Bone CSA was responsible for performing the following activities: Retraction, introduction of needles and mesh, exchange of instruments at bedside, skin closure and placement of dressings and manipulation of camera and their skilled assistance was necessary for the success of this case.      Anesthesia: General    EBL:   minimal    Specimens:   None    Indications:  This is a nice 65-year-old gentleman with an initial symptomatic and reducible right inguinal hernia    Findings:   Moderate-sized indirect right inguinal hernia  No evidence of hernia seen on the left    Recommendations:   Routine post hernia care    Description of procedure:    After obtaining informed consent, patient was brought to the operating room and placed under a general anesthetic.  The abdomen was sterilely prepped and draped.  Supraumbilical skin incision was made and dissected through the subcutaneous tissue.  Fascia was incised in the midline.  #1 Vicryl suture was placed on each side of the fascia in a U stitch pattern.  The peritoneum was bluntly penetrated.  8 mm robotic Mohamud trocar was introduced and the abdomen was insufflated with CO2.  Initial examination of the abdomen demonstrated evidence of a moderate-sized indirect defect on the right and no evidence of hernia on the left.  Additional 8 mm robotic trocars were introduced on the left and right at the level of the supraumbilical trocar.  The da Bambi Xi robot was then brought up and docked.  The monopolar scissors were introduced on the right and the force bipolar grasper on the left under direct visualization.  The patient was placed approximately 10 degrees head  down.  The peritoneum overlying the anterior superior iliac spine on the right was incised and this peritoneal incision was then carried across medially to the level of the obliterated median umbilical vein.  This preperitoneal dissection was carried along the medial aspect until I worked my way all the way down onto Pradeep's ligament medially.  The medial space was cleared completely.  I then worked out lateral to the inguinal canal and dissected the lateral space.  I then began peeling the hernia sac out of the inguinal canal and  away from the cord structures.  Peritoneum was peeled down, allowing plenty of space to lay the mesh.  I then introduced a large Bard 3D Luh 10 x 16) mid weight preshaped polypropylene mesh.  The mesh was fixed medially to Pradeep's ligament with 2-0 Vicryl and then along the anterior abdominal wall both medial and lateral to the epigastric vessels, again using 2-0 Vicryl.  The peritoneal flap was then lifted up and I ensured that it did not interfere with the inferior aspect of the mesh.  The flap was then closed with a running barbed 2-0 Monocryl suture, incorporating the hernia sac into the closure.  Needles were retrieved.  The robot was undocked.  The abdomen was desufflated.  The midline fascial incision was reapproximated with #1 Vicryl suture.  Skin incisions were closed with 4-0 Monocryl.  Sterile dressings were applied.    Jameel Bradshaw MD  General and Endoscopic Surgery  [facility]    [address], [address]  [place], [place], [address]  P: [phone]

## 2025-05-09 NOTE — ANESTHESIA PROCEDURE NOTES
Airway  Reason: elective    Date/Time: 5/9/2025 8:03 AM  Airway not difficult    General Information and Staff    Patient location during procedure: OR  CRNA/CAA: Valdo Felder CRNA    Indications and Patient Condition  Indications for airway management: airway protection    Preoxygenated: yes  MILS maintained throughout    Mask difficulty assessment: 1 - vent by mask    Final Airway Details    Final airway type: endotracheal airway      Successful airway: ETT  Cuffed: yes   Successful intubation technique: direct laryngoscopy  Adjuncts used in placement: intubating stylet  Endotracheal tube insertion site: oral  Blade size: 2  ETT size (mm): 7.0  Cormack-Lehane Classification: grade I - full view of glottis  Placement verified by: chest auscultation and capnometry   Cuff volume (mL): 7  Measured from: lips  ETT/EBT  to lips (cm): 23  Number of attempts at approach: 1  Assessment: lips, teeth, and gum same as pre-op and atraumatic intubation

## 2025-05-09 NOTE — ANESTHESIA PREPROCEDURE EVALUATION
Anesthesia Evaluation     NPO Solid Status: > 8 hours  NPO Liquid Status: > 2 hours           Airway   Mallampati: II  TM distance: >3 FB  Neck ROM: full  Dental - normal exam     Pulmonary    (-) not a smoker  Cardiovascular   Exercise tolerance: good (4-7 METS)    Rhythm: regular  Rate: normal    (+) hypertension well controlled less than 2 medications      Neuro/Psych  (+) psychiatric history Anxiety  GI/Hepatic/Renal/Endo    (+) renal disease- stones    Musculoskeletal     Abdominal    Substance History      OB/GYN          Other                          Anesthesia Plan    ASA 2     general     intravenous induction     Anesthetic plan, risks, benefits, and alternatives have been provided, discussed and informed consent has been obtained with: patient.        CODE STATUS:

## 2025-05-11 DIAGNOSIS — F41.1 GENERALIZED ANXIETY DISORDER: ICD-10-CM

## 2025-05-12 RX ORDER — ESCITALOPRAM OXALATE 10 MG/1
10 TABLET ORAL DAILY
Qty: 90 TABLET | Refills: 1 | Status: SHIPPED | OUTPATIENT
Start: 2025-05-12

## 2025-05-19 ENCOUNTER — OFFICE VISIT (OUTPATIENT)
Dept: SURGERY | Facility: CLINIC | Age: 65
End: 2025-05-19
Payer: MEDICARE

## 2025-05-19 VITALS
DIASTOLIC BLOOD PRESSURE: 75 MMHG | SYSTOLIC BLOOD PRESSURE: 130 MMHG | HEIGHT: 67 IN | HEART RATE: 62 BPM | BODY MASS INDEX: 24.64 KG/M2 | WEIGHT: 157 LBS | OXYGEN SATURATION: 98 %

## 2025-05-19 DIAGNOSIS — K40.90 RIGHT INGUINAL HERNIA: Primary | ICD-10-CM

## 2025-05-19 NOTE — PROGRESS NOTES
Postop robotic right inguinal hernia repair    Subjective:  Feels well, minimal discomfort, tolerating diet with good GI function    Objective:  BMI 24.6  General: Awake and alert, no distress  Abdomen: Soft and benign, trocar incisions are well-healed  Genitourinary: Probable small seroma, no evidence for hernia recurrence, no significant discomfort    Assessment and plan:  - Postop robotic right inguinal hernia repair, recovering well  - At 2 weeks okay for any and all activity as tolerated  - Follow-up here as needed    Jameel Bradshaw MD  General and Endoscopic Surgery  [facility]    [address], [address]  [place], [place], [address]  P: [phone]  F: [phone]

## 2025-05-25 DIAGNOSIS — I10 UNCONTROLLED HYPERTENSION: ICD-10-CM

## 2025-05-25 DIAGNOSIS — I10 ESSENTIAL HYPERTENSION: ICD-10-CM

## 2025-05-27 RX ORDER — TELMISARTAN 40 MG/1
40 TABLET ORAL DAILY
Qty: 90 TABLET | Refills: 1 | Status: SHIPPED | OUTPATIENT
Start: 2025-05-27

## 2025-07-20 DIAGNOSIS — I10 UNCONTROLLED HYPERTENSION: ICD-10-CM

## 2025-07-20 DIAGNOSIS — I10 ESSENTIAL HYPERTENSION: ICD-10-CM

## 2025-07-21 RX ORDER — TELMISARTAN 40 MG/1
40 TABLET ORAL DAILY
Qty: 90 TABLET | Refills: 1 | Status: SHIPPED | OUTPATIENT
Start: 2025-07-21

## 2025-08-29 ENCOUNTER — TELEMEDICINE (OUTPATIENT)
Dept: FAMILY MEDICINE CLINIC | Facility: TELEHEALTH | Age: 65
End: 2025-08-29
Payer: MEDICARE

## 2025-08-29 DIAGNOSIS — J01.00 SUBACUTE MAXILLARY SINUSITIS: Primary | ICD-10-CM

## 2025-08-29 RX ORDER — BENZONATATE 200 MG/1
200 CAPSULE ORAL 3 TIMES DAILY PRN
Qty: 30 CAPSULE | Refills: 0 | Status: SHIPPED | OUTPATIENT
Start: 2025-08-29 | End: 2025-09-08

## (undated) DEVICE — ARM DRAPE

## (undated) DEVICE — 1LYRTR 16FR10ML100%SIL UMS SNP: Brand: MEDLINE INDUSTRIES, INC.

## (undated) DEVICE — TUBING, SUCTION, 1/4" X 10', STRAIGHT: Brand: MEDLINE

## (undated) DEVICE — LN SMPL CO2 SHTRM SD STREAM W/M LUER

## (undated) DEVICE — SENSR O2 OXIMAX FNGR A/ 18IN NONSTR

## (undated) DEVICE — LAPAROVUE VISIBILITY SYSTEM LAPAROSCOPIC SOLUTIONS: Brand: LAPAROVUE

## (undated) DEVICE — COVER,MAYO STAND,STERILE: Brand: MEDLINE

## (undated) DEVICE — THE TORRENT IRRIGATION SCOPE CONNECTOR IS USED WITH THE TORRENT IRRIGATION TUBING TO PROVIDE IRRIGATION FLUIDS SUCH AS STERILE WATER DURING GASTROINTESTINAL ENDOSCOPIC PROCEDURES WHEN USED IN CONJUNCTION WITH AN IRRIGATION PUMP (OR ELECTROSURGICAL UNIT).: Brand: TORRENT

## (undated) DEVICE — KT ORCA ORCAPOD DISP STRL

## (undated) DEVICE — VIOLET BRAIDED (POLYGLACTIN 910), SYNTHETIC ABSORBABLE SUTURE: Brand: COATED VICRYL

## (undated) DEVICE — BLADELESS OBTURATOR: Brand: WECK VISTA

## (undated) DEVICE — COLUMN DRAPE

## (undated) DEVICE — CANN O2 ETCO2 FITS ALL CONN CO2 SMPL A/ 7IN DISP LF

## (undated) DEVICE — CANN NASL CO2 TRULINK W/O2 A/

## (undated) DEVICE — ADAPT CLN BIOGUARD AIR/H2O DISP

## (undated) DEVICE — SYR LL TP 10ML STRL

## (undated) DEVICE — Device: Brand: DEFENDO AIR/WATER/SUCTION AND BIOPSY VALVE

## (undated) DEVICE — PENCL ES MEGADINE EZ/CLEAN BUTN W/HOLSTR 10FT

## (undated) DEVICE — TBG INSUFFLATION LUER LOCK: Brand: MEDLINE INDUSTRIES, INC.

## (undated) DEVICE — LOU LAP CHOLE: Brand: MEDLINE INDUSTRIES, INC.

## (undated) DEVICE — GLV SURG BIOGEL LTX PF 8 1/2

## (undated) DEVICE — ADHS LIQ MASTISOL 2/3ML

## (undated) DEVICE — ANTIBACTERIAL UNDYED BRAIDED (POLYGLACTIN 910), SYNTHETIC ABSORBABLE SURGICAL SUTURE: Brand: COATED VICRYL

## (undated) DEVICE — THE STERILE LIGHT HANDLE COVER IS USED WITH STERIS SURGICAL LIGHTING AND VISUALIZATION SYSTEMS.

## (undated) DEVICE — SEAL

## (undated) DEVICE — DRSNG WND BORDR/ADHS NONADHR/GZ LF 2X2IN STRL

## (undated) DEVICE — SINGLE-USE BIOPSY FORCEPS: Brand: RADIAL JAW 4

## (undated) DEVICE — SUT MNCRYL PLS ANTIB UD 4/0 PS2 18IN

## (undated) DEVICE — TIP COVER ACCESSORY

## (undated) DEVICE — APPL CHLORAPREP HI/LITE 26ML ORNG